# Patient Record
Sex: FEMALE | Race: WHITE | NOT HISPANIC OR LATINO | Employment: FULL TIME | URBAN - NONMETROPOLITAN AREA
[De-identification: names, ages, dates, MRNs, and addresses within clinical notes are randomized per-mention and may not be internally consistent; named-entity substitution may affect disease eponyms.]

---

## 2017-03-24 ENCOUNTER — HOSPITAL ENCOUNTER (INPATIENT)
Facility: HOSPITAL | Age: 42
LOS: 4 days | Discharge: HOME | DRG: 871 | End: 2017-03-28
Attending: FAMILY MEDICINE | Admitting: FAMILY MEDICINE
Payer: COMMERCIAL

## 2017-03-24 DIAGNOSIS — J18.9 PNEUMONIA OF LEFT LOWER LOBE DUE TO INFECTIOUS ORGANISM: Primary | ICD-10-CM

## 2017-03-24 PROBLEM — A41.89 SEPSIS DUE TO OTHER ETIOLOGY (H): Status: ACTIVE | Noted: 2017-03-24

## 2017-03-24 PROBLEM — R09.02 HYPOXIA: Status: ACTIVE | Noted: 2017-03-24

## 2017-03-24 PROBLEM — A41.9 SEPSIS (H): Status: ACTIVE | Noted: 2017-03-24

## 2017-03-24 LAB
CREAT SERPL-MCNC: 0.81 MG/DL (ref 0.52–1.04)
GFR SERPL CREATININE-BSD FRML MDRD: 78 ML/MIN/1.7M2
LACTATE SERPL-SCNC: 2.3 MMOL/L (ref 0.4–2)
LACTATE SERPL-SCNC: 4 MMOL/L (ref 0.4–2)

## 2017-03-24 PROCEDURE — S0077 INJECTION, CLINDAMYCIN PHOSP: HCPCS | Performed by: FAMILY MEDICINE

## 2017-03-24 PROCEDURE — 25800025 ZZH RX 258: Performed by: FAMILY MEDICINE

## 2017-03-24 PROCEDURE — 36415 COLL VENOUS BLD VENIPUNCTURE: CPT | Performed by: FAMILY MEDICINE

## 2017-03-24 PROCEDURE — 25000132 ZZH RX MED GY IP 250 OP 250 PS 637: Performed by: FAMILY MEDICINE

## 2017-03-24 PROCEDURE — 85025 COMPLETE CBC W/AUTO DIFF WBC: CPT | Performed by: FAMILY MEDICINE

## 2017-03-24 PROCEDURE — 80053 COMPREHEN METABOLIC PANEL: CPT | Performed by: FAMILY MEDICINE

## 2017-03-24 PROCEDURE — 82565 ASSAY OF CREATININE: CPT | Performed by: FAMILY MEDICINE

## 2017-03-24 PROCEDURE — 87040 BLOOD CULTURE FOR BACTERIA: CPT | Performed by: FAMILY MEDICINE

## 2017-03-24 PROCEDURE — 25000125 ZZHC RX 250: Performed by: FAMILY MEDICINE

## 2017-03-24 PROCEDURE — 25000128 H RX IP 250 OP 636: Performed by: FAMILY MEDICINE

## 2017-03-24 PROCEDURE — 20000003 ZZH R&B ICU

## 2017-03-24 PROCEDURE — 25000132 ZZH RX MED GY IP 250 OP 250 PS 637

## 2017-03-24 PROCEDURE — S0028 INJECTION, FAMOTIDINE, 20 MG: HCPCS | Performed by: FAMILY MEDICINE

## 2017-03-24 PROCEDURE — 83605 ASSAY OF LACTIC ACID: CPT | Performed by: FAMILY MEDICINE

## 2017-03-24 RX ORDER — TRAMADOL HYDROCHLORIDE 50 MG/1
50 TABLET ORAL EVERY 6 HOURS PRN
Status: DISCONTINUED | OUTPATIENT
Start: 2017-03-24 | End: 2017-03-28 | Stop reason: HOSPADM

## 2017-03-24 RX ORDER — DOPAMINE HYDROCHLORIDE 160 MG/100ML
2-20 INJECTION, SOLUTION INTRAVENOUS CONTINUOUS
Status: DISCONTINUED | OUTPATIENT
Start: 2017-03-25 | End: 2017-03-27

## 2017-03-24 RX ORDER — IBUPROFEN 600 MG/1
600 TABLET, FILM COATED ORAL EVERY 8 HOURS PRN
Status: DISCONTINUED | OUTPATIENT
Start: 2017-03-24 | End: 2017-03-28 | Stop reason: HOSPADM

## 2017-03-24 RX ORDER — DIPHENHYDRAMINE HYDROCHLORIDE 50 MG/ML
25 INJECTION INTRAMUSCULAR; INTRAVENOUS EVERY 6 HOURS PRN
Status: DISCONTINUED | OUTPATIENT
Start: 2017-03-24 | End: 2017-03-28 | Stop reason: HOSPADM

## 2017-03-24 RX ORDER — DEXTROSE MONOHYDRATE, SODIUM CHLORIDE, SODIUM LACTATE, POTASSIUM CHLORIDE, CALCIUM CHLORIDE 5; 600; 310; 179; 20 G/100ML; MG/100ML; MG/100ML; MG/100ML; MG/100ML
INJECTION, SOLUTION INTRAVENOUS CONTINUOUS
Status: DISCONTINUED | OUTPATIENT
Start: 2017-03-24 | End: 2017-03-25

## 2017-03-24 RX ORDER — CLINDAMYCIN PHOSPHATE 900 MG/50ML
900 INJECTION, SOLUTION INTRAVENOUS EVERY 8 HOURS
Status: DISCONTINUED | OUTPATIENT
Start: 2017-03-24 | End: 2017-03-28 | Stop reason: HOSPADM

## 2017-03-24 RX ORDER — ACETAMINOPHEN 325 MG/1
TABLET ORAL
Status: COMPLETED
Start: 2017-03-24 | End: 2017-03-24

## 2017-03-24 RX ORDER — ONDANSETRON 2 MG/ML
8 INJECTION INTRAMUSCULAR; INTRAVENOUS ONCE
Status: COMPLETED | OUTPATIENT
Start: 2017-03-24 | End: 2017-03-24

## 2017-03-24 RX ORDER — ACETAMINOPHEN 325 MG/1
650 TABLET ORAL EVERY 4 HOURS PRN
Status: DISCONTINUED | OUTPATIENT
Start: 2017-03-24 | End: 2017-03-28 | Stop reason: HOSPADM

## 2017-03-24 RX ORDER — OSELTAMIVIR PHOSPHATE 75 MG/1
75 CAPSULE ORAL 2 TIMES DAILY
Status: DISCONTINUED | OUTPATIENT
Start: 2017-03-24 | End: 2017-03-28 | Stop reason: HOSPADM

## 2017-03-24 RX ORDER — NALOXONE HYDROCHLORIDE 0.4 MG/ML
.1-.4 INJECTION, SOLUTION INTRAMUSCULAR; INTRAVENOUS; SUBCUTANEOUS
Status: DISCONTINUED | OUTPATIENT
Start: 2017-03-24 | End: 2017-03-28 | Stop reason: HOSPADM

## 2017-03-24 RX ORDER — LEVOFLOXACIN 5 MG/ML
750 INJECTION, SOLUTION INTRAVENOUS EVERY 24 HOURS
Status: DISCONTINUED | OUTPATIENT
Start: 2017-03-24 | End: 2017-03-28 | Stop reason: HOSPADM

## 2017-03-24 RX ORDER — DIPHENHYDRAMINE HCL 25 MG
25 CAPSULE ORAL
Status: DISCONTINUED | OUTPATIENT
Start: 2017-03-24 | End: 2017-03-28 | Stop reason: HOSPADM

## 2017-03-24 RX ORDER — ONDANSETRON 2 MG/ML
8 INJECTION INTRAMUSCULAR; INTRAVENOUS EVERY 8 HOURS PRN
Status: DISCONTINUED | OUTPATIENT
Start: 2017-03-24 | End: 2017-03-28 | Stop reason: HOSPADM

## 2017-03-24 RX ADMIN — ACETAMINOPHEN 650 MG: 325 TABLET, FILM COATED ORAL at 18:58

## 2017-03-24 RX ADMIN — ACETAMINOPHEN 650 MG: 325 TABLET, FILM COATED ORAL at 12:03

## 2017-03-24 RX ADMIN — CLINDAMYCIN IN 5 PERCENT DEXTROSE 900 MG: 18 INJECTION, SOLUTION INTRAVENOUS at 13:27

## 2017-03-24 RX ADMIN — VANCOMYCIN HYDROCHLORIDE 1250 MG: 1 INJECTION, POWDER, LYOPHILIZED, FOR SOLUTION INTRAVENOUS at 16:31

## 2017-03-24 RX ADMIN — SODIUM CHLORIDE 1000 ML: 9 INJECTION, SOLUTION INTRAVENOUS at 12:03

## 2017-03-24 RX ADMIN — SODIUM CHLORIDE, POTASSIUM CHLORIDE, SODIUM LACTATE AND CALCIUM CHLORIDE 1000 ML: 600; 310; 30; 20 INJECTION, SOLUTION INTRAVENOUS at 17:37

## 2017-03-24 RX ADMIN — ONDANSETRON 8 MG: 2 INJECTION INTRAMUSCULAR; INTRAVENOUS at 21:01

## 2017-03-24 RX ADMIN — LEVOFLOXACIN 750 MG: 5 INJECTION, SOLUTION INTRAVENOUS at 14:40

## 2017-03-24 RX ADMIN — ENOXAPARIN SODIUM 40 MG: 40 INJECTION SUBCUTANEOUS at 17:51

## 2017-03-24 RX ADMIN — FAMOTIDINE 20 MG: 10 INJECTION, SOLUTION INTRAVENOUS at 21:01

## 2017-03-24 RX ADMIN — TRAMADOL HYDROCHLORIDE 50 MG: 50 TABLET, COATED ORAL at 17:50

## 2017-03-24 RX ADMIN — SODIUM CHLORIDE, POTASSIUM CHLORIDE, SODIUM LACTATE AND CALCIUM CHLORIDE 1000 ML: 600; 310; 30; 20 INJECTION, SOLUTION INTRAVENOUS at 19:16

## 2017-03-24 RX ADMIN — CLINDAMYCIN IN 5 PERCENT DEXTROSE 900 MG: 18 INJECTION, SOLUTION INTRAVENOUS at 21:16

## 2017-03-24 RX ADMIN — IBUPROFEN 600 MG: 600 TABLET ORAL at 14:14

## 2017-03-24 ASSESSMENT — PAIN DESCRIPTION - DESCRIPTORS
DESCRIPTORS: ACHING

## 2017-03-24 NOTE — IP AVS SNAPSHOT
[facility]    [address] [place] [id number]    Phone:  [phone]    Fax:  [phone]                                       After Visit Summary   3/24/2017    Sofy Grossman    MRN: [id number]           After Visit Summary Signature Page     I have received my discharge instructions, and my questions have been answered. I have discussed any challenges I see with this plan with the nurse or doctor.    ..........................................................................................................................................  Patient/Patient Representative Signature      ..........................................................................................................................................  Patient Representative Print Name and Relationship to Patient    ..................................................               ................................................  Date                                            Time    ..........................................................................................................................................  Reviewed by Signature/Title    ...................................................              ..............................................  Date                                                            Time

## 2017-03-24 NOTE — PLAN OF CARE
Problem: Patient Goal: Rt Focus  Goal: 1. Patient Goal: RT Focus  [facility] - Respiratory Clinical Assessment     Current Patient History:    Respiratory History: tobacco abuse    Smoking History: 1/2 ppd    Oxygen dependency: No,    Oxygen prescribed: none     3/24/2017 2:09 PM Patient Initial Assessment:     Level of Consciousness: alert , cooperative    Skin color: pink    Lung sounds:coarse    Respirations:  Normal with easy respirations and no use of accessory muscles to breathe    Respiratory symptoms: productive cough    Cough/Sputum:  productive of yellow sputum    Current oxygenation status: 91% on 2lpm

## 2017-03-24 NOTE — PROGRESS NOTES
ARMAND MAKI - Patient requested visit by . Patient looks and noted she feels very sick but requested prayer with .

## 2017-03-24 NOTE — IP AVS SNAPSHOT
MRN:[id number]                      After Visit Summary   3/24/2017    Sofy Grossman    MRN: [id number]           Thank you!     Thank you for choosing [facility] for your care. Our goal is always to provide you with excellent care. Hearing back from our patients is one way we can continue to improve our services. Please take a few minutes to complete the written survey that you may receive in the mail after you visit with us. Thank you!        Patient Information     Date Of Birth          1975        About your hospital stay     You were admitted on:  March 24, 2017 You last received care in the:  [facility]    You were discharged on:  March 28, 2017       Who to Call     For medical emergencies, please call 911.  For non-urgent questions about your medical care, please call your primary care provider or clinic, [phone]          Attending Provider     Provider Specialty    Kip Sutherland DO [facility]       Primary Care Provider Office Phone # Fax #    Kip Sutherland [phone]       [facility] [address] [facility] [address]        After Care Instructions     Activity       Your activity upon discharge: activity as tolerated            Diet       Follow this diet upon discharge: Regular                  Follow-up Appointments     Follow-up and recommended labs and tests        Follow up with primary care provider, Kip Sutherland, within 7 days for hospital follow- up.  No follow up labs or test are needed.                  Pending Results     Date and Time Order Name Status Description    3/24/2017 1159 Blood culture Preliminary     3/24/2017 1159 Blood culture Preliminary             Statement of Approval     Ordered          03/28/17 0814  I have reviewed and agree with all the recommendations and orders detailed in this document.  EFFECTIVE NOW     Approved and electronically signed by:  Kip Sutherland DO   "           Admission Information     Date & Time Provider Department Dept. Phone    3/24/2017 Kip Sutherland, DO [facility] [phone]      Your Vitals Were     Blood Pressure Pulse Temperature Respirations Height Weight    131/85 (BP Location: Right arm) 83 100.3  F (37.9  C) (Tympanic) 20 1.702 m (5' 7\") 71 kg (156 lb 8.4 oz)    Pulse Oximetry BMI (Body Mass Index)                94% 24.52 kg/m2          Soonr Information     Soonr lets you send messages to your doctor, view your test results, renew your prescriptions, schedule appointments and more. To sign up, go to [email or web address] . Click on \"Log in\" on the left side of the screen, which will take you to the Welcome page. Then click on \"Sign up Now\" on the right side of the page.     You will be asked to enter the access code listed below, as well as some personal information. Please follow the directions to create your username and password.     Your access code is: [email or web address]  Expires: 2017  8:47 AM     Your access code will  in 90 days. If you need help or a new code, please call your [place] clinic or [phone].        Care EveryWhere ID     This is your Care EveryWhere ID. This could be used by other organizations to access your [place] medical records  [id number]           Review of your medicines      START taking        Dose / Directions    levofloxacin 500 MG tablet   Commonly known as:  LEVAQUIN        Dose:  500 mg   Take 1 tablet (500 mg) by mouth daily for 5 days   Quantity:  5 tablet   Refills:  0         CONTINUE these medicines which have NOT CHANGED        Dose / Directions    ACETAMINOPHEN PO        Dose:  500 mg   Take 500 mg by mouth every 4 hours as needed for pain   Refills:  0       IBUPROFEN PO        Dose:  600 mg   Take 600 mg by mouth every 8 hours as needed for moderate pain   Refills:  0       NONFORMULARY        as needed \"Eladil cream\" for exema   Refills:  0         STOP taking     " "TAMIFLU PO                Where to get your medicines      Some of these will need a paper prescription and others can be bought over the counter. Ask your nurse if you have questions.     Bring a paper prescription for each of these medications     levofloxacin 500 MG tablet                Protect others around you: Learn how to safely use, store and throw away your medicines at www.disposemymeds.org.             Medication List: This is a list of all your medications and when to take them. Check marks below indicate your daily home schedule. Keep this list as a reference.      Medications           Morning Afternoon Evening Bedtime As Needed    ACETAMINOPHEN PO   Take 500 mg by mouth every 4 hours as needed for pain   Last time this was given:  650 mg on 3/27/2017 11:35 PM                                IBUPROFEN PO   Take 600 mg by mouth every 8 hours as needed for moderate pain   Last time this was given:  600 mg on 3/27/2017 11:51 AM                                levofloxacin 500 MG tablet   Commonly known as:  LEVAQUIN   Take 1 tablet (500 mg) by mouth daily for 5 days                                NONFORMULARY   as needed \"Eladil cream\" for exema                                  "

## 2017-03-24 NOTE — PHARMACY-VANCOMYCIN DOSING SERVICE
Pharmacy Vancomycin Initial Note  Date of Service 2017  Patient's  1975  41 year old, female    Indication: Sepsis,pneumonia    Current estimated CrCl = Estimated Creatinine Clearance: 92.3 mL/min (based on Cr of 0.81).    Creatinine for last 3 days  3/24/2017: 12:04 PM Creatinine 0.81 mg/dL    Recent Vancomycin Level(s) for last 3 days  No results found for requested labs within last 72 hours.      Vancomycin IV Administrations (past 72 hours)      No vancomycin orders with administrations in past 72 hours.                Nephrotoxins and other renal medications (Future)    Start     Dose/Rate Route Frequency Ordered Stop    17 1415  vancomycin (VANCOCIN) 1,250 mg in NaCl 0.9 % 250 mL intermittent infusion      1,250 mg Intravenous EVERY 12 HOURS 17 1405      17 1401  ibuprofen (ADVIL/MOTRIN) tablet 600 mg      600 mg Oral EVERY 8 HOURS PRN 17 1402            Contrast Orders - past 72 hours     None                Plan:  1.  Start vancomycin  1250 mg IV q12h.   2.  Goal Trough Level: 15-20 mg/L   3.  Pharmacy will check trough levels as appropriate in 1-3 Days.    4. Serum creatinine levels will be ordered daily for the first week of therapy and at least twice weekly for subsequent weeks.    5. Selden method utilized to dose vancomycin therapy: Method 2    Erica Alcantara

## 2017-03-24 NOTE — PROGRESS NOTES
Addendum: Pt still has low BP and complains of body aches.  Fever down but still elevated.  Will give additional liter bolus of fluid if no improvement will consider transfer to ICU for pressers.  Cultures and labs are still pending .

## 2017-03-24 NOTE — PLAN OF CARE
Problem: Goal Outcome Summary  Goal: Goal Outcome Summary  Outcome: No Change  A&O. VS, HR tachy 125-110, BP slightly hypotensive 96/50's, RR 28. Temp 104.8 cool cloth to skin, fan to room, tylenol administered with decrease to 99.8. IVF running of NS. IV cleocin and Levaquin administered this shift with Vancomycin to be given. LS diminished in bases. Infrequent nonproductive cough noted. Tested positive for influenza B, not able to obtain second flu swab this shift. Lactic acid 4.0, ordered redraw in 6hrs. Pt reports dizziness on moving, educated to use call light for any transfers. Reg diet with decreased appetite.      Face to face report given with opportunity to observe patient.    Report given to CHERYL Phan   3/24/2017  3:40 PM

## 2017-03-24 NOTE — PLAN OF CARE
Problem: Goal Outcome Summary  Goal: Goal Outcome Summary  Outcome: No Change  [facility] Inpatient Admission Note:     Patient admitted to 3232/3232-1 at approximately 1200 via wheel chair accompanied by mother from clinic . Report received from Radha in SBAR format at 1200 via face to face in room. Patient ambulated to bed via self.. Patient is alert and oriented X 3, reports pain; rates at 8 on 0-10 scale.  Patient oriented to room, unit, hourly rounding, and plan of care. Explained admission packet and patient handbook with patient bill of rights brochure. Will continue to monitor and document as needed.      Inpatient Nursing criteria listed below was met:        Health care directives status obtained and documented: Yes        Care Everywhere authorization obtained No        MRSA swab completed for patient 65 years and older: N/A        Patient identifies a surrogate decision maker: Yes If yes, who:Spouse Herb Contact Information:[phone]        Core Measure diagnosis present:Yes. If yes, state diagnosis: Pneumonia         If initial lactic acid >2.0, repeat lactic acid drawn within one hour of arrival to unit: NA. If no, state reason: Lactic drawn on arrival        Vaccination assessment and education completed: Yes              Vaccinations received prior to admission: Pneumovax no    Influenza(seasonal)  NO              Vaccination(s) ordered: patient declines        Clergy visit ordered if patient requests: N/A        Skin issues/needs documented: N/A        Isolation Patient: yes Education given, correct sign in place and documentation row added to PCS:  Yes        Fall Prevention No: Care plan updated, education given and documented, sticker and magnet in place: No        Care Plan initiated: Yes        Education Documented (including assessment): Yes        Patient has discharge needs : No If yes, please explain:N/A

## 2017-03-24 NOTE — H&P
[facility]    History and Physical  Hospitalist       Date of Admission:  3/24/2017    Assessment & Plan   Sofy Grossman is a 41 year old female who presents with     Principal Problem:    Pneumonia of left lower lobe due to infectious organism    Assessment: PT diagnosed yesterday with influenza B today noticed difficulty breathing and presented to clinic with hypoxia and infiltrate on lung.    Plan: admit to hospital pt shows signs of sepsis as well as influenza.  Will culture and provide fluids and triple antibiotic coverage until improvement of source identified.  Active Problems:    Sepsis due to other etiology (H)    Assessment: PT has low BP and tachycardia and fever with hypoxia.    Plan: will admit and treat empirically for sepsis and follow fluid challenge and triple antibiotic    Hypoxia    Assessment: stable    Plan: will provide oxygen and pulmonary toilet    DVT Prophylaxis: Enoxaprain (Lovenox) SQ  Code Status: Full Code    Disposition: Expected discharge in 3  days once sepsis if resolved.    Kip Sutherland    Primary Care Physician   Kip Sutherland    Chief Complaint   Fever and dyspnea    History is obtained from the patient    History of Present Illness   Sofy Grossman is a 41 year old female who presents with acute onset of dyspnea.  PT was treated for influenza B and now has infiltrate of LLL. Pt complained of pain with inspiration radiating to the left lower back area.    Past Medical History    I have reviewed this patient's medical history and updated it with pertinent information if needed.   Past Medical History:   Diagnosis Date     Cervical spine fracture (H)      Depressive disorder        Past Surgical History   I have reviewed this patient's surgical history and updated it with pertinent information if needed.  Past Surgical History:   Procedure Laterality Date     GYN SURGERY      LEEP 2008       Prior to Admission Medications   Prior to Admission  "Medications   Prescriptions Last Dose Informant Patient Reported? Taking?   ACETAMINOPHEN PO   Yes Yes   Sig: Take 500 mg by mouth every 4 hours as needed for pain   IBUPROFEN PO   Yes Yes   Sig: Take 600 mg by mouth every 8 hours as needed for moderate pain   NONFORMULARY Past Week at Unknown time  Yes Yes   Sig: as needed \"Eladil cream\" for exema   Oseltamivir Phosphate (TAMIFLU PO)   Yes Yes   Sig: Take 75 mg by mouth 2 times daily      Facility-Administered Medications: None     Allergies   Allergies   Allergen Reactions     Doxycycline Nausea and Vomiting     Penicillin G      Penicillins        Social History   I have reviewed this patient's social history and updated it with pertinent information if needed. Sofy Grossman  reports that she has been smoking Cigarettes.  She has a 5.00 pack-year smoking history. She does not have any smokeless tobacco history on file. She reports that she drinks alcohol. She reports that she does not use illicit drugs.    Family History   I have reviewed this patient's family history and updated it with pertinent information if needed.   Family History   Problem Relation Age of Onset     Substance Abuse Mother      Coronary Artery Disease Father      Substance Abuse Father        Review of Systems   C: POSITIVE for fever, chills,   E/M: NEGATIVE for ear, mouth and throat problems  R: POSITIVE for significant cough or SOB  CV: POSITIVE for chest pain,     Physical Exam   Temp: (!) 103.2  F (39.6  C) Temp src: Tympanic BP: 96/55   Heart Rate: 119 Resp: (!) 28 SpO2: 94 % O2 Device: Nasal cannula Oxygen Delivery: 2 LPM  Vital Signs with Ranges  Temp:  [103.2  F (39.6  C)-104.8  F (40.4  C)] 103.2  F (39.6  C)  Heart Rate:  [119-125] 119  Resp:  [28] 28  BP: ()/(55-64) 96/55  SpO2:  [93 %-96 %] 94 %  138 lbs 3.65 oz    Constitutional: ill appearing  Eyes: moises icteric  HEENT: mild inflamation of pharynx and congested sinuses  Respiratory: diminished sounds in LEft no " wheezing  Cardiovascular: RRR without M  GI: soft and non tender  Lymph/Hematologic: normal  Genitourinary: normal  Skin: no rashes  Musculoskeletal: normal no weakness  Neurologic: no focal defecits  Psychiatric: normal    Data   Data reviewed today:  I personally reviewed the chest x-ray image(s) showing Left lower infiltrate.  No lab results found in last 7 days.    No results found for this or any previous visit (from the past 24 hour(s)).

## 2017-03-24 NOTE — PROVIDER NOTIFICATION
DATE:  3/24/2017   TIME OF RECEIPT FROM LAB:  4233  LAB TEST:  Lactic acid  LAB VALUE:  4.0  RESULTS GIVEN WITH READ-BACK TO Dr. Sutherland  TIME LAB VALUE REPORTED TO PROVIDER:   Paged at 2289 -

## 2017-03-24 NOTE — PROVIDER NOTIFICATION
ronna returned page at 0428-   DATE:  3/24/2017   TIME OF RECEIPT FROM LAB:  1231  LAB TEST:lactate  LAB VALUE:4.0  RESULTS GIVEN WITH READ-BACK TO (PROVIDER):  ronna  TIME LAB VALUE REPORTED TO PROVIDER:  6059

## 2017-03-25 LAB
ALBUMIN SERPL-MCNC: 2.2 G/DL (ref 3.4–5)
ALBUMIN SERPL-MCNC: 2.2 G/DL (ref 3.4–5)
ALP SERPL-CCNC: 21 U/L (ref 40–150)
ALP SERPL-CCNC: 22 U/L (ref 40–150)
ALT SERPL W P-5'-P-CCNC: 15 U/L (ref 0–50)
ALT SERPL W P-5'-P-CCNC: 16 U/L (ref 0–50)
ANION GAP SERPL CALCULATED.3IONS-SCNC: 10 MMOL/L (ref 3–14)
ANION GAP SERPL CALCULATED.3IONS-SCNC: 8 MMOL/L (ref 3–14)
AST SERPL W P-5'-P-CCNC: 19 U/L (ref 0–45)
AST SERPL W P-5'-P-CCNC: 19 U/L (ref 0–45)
BASOPHILS # BLD AUTO: 0 10E9/L (ref 0–0.2)
BASOPHILS # BLD AUTO: 0 10E9/L (ref 0–0.2)
BASOPHILS NFR BLD AUTO: 0 %
BASOPHILS NFR BLD AUTO: 0 %
BILIRUB SERPL-MCNC: 0.5 MG/DL (ref 0.2–1.3)
BILIRUB SERPL-MCNC: 0.6 MG/DL (ref 0.2–1.3)
BUN SERPL-MCNC: 8 MG/DL (ref 7–30)
BUN SERPL-MCNC: 9 MG/DL (ref 7–30)
CALCIUM SERPL-MCNC: 6.9 MG/DL (ref 8.5–10.1)
CALCIUM SERPL-MCNC: 7.2 MG/DL (ref 8.5–10.1)
CHLORIDE SERPL-SCNC: 102 MMOL/L (ref 94–109)
CHLORIDE SERPL-SCNC: 109 MMOL/L (ref 94–109)
CO2 SERPL-SCNC: 20 MMOL/L (ref 20–32)
CO2 SERPL-SCNC: 24 MMOL/L (ref 20–32)
CREAT SERPL-MCNC: 0.64 MG/DL (ref 0.52–1.04)
CREAT SERPL-MCNC: 0.66 MG/DL (ref 0.52–1.04)
DIFFERENTIAL METHOD BLD: ABNORMAL
DIFFERENTIAL METHOD BLD: ABNORMAL
EOSINOPHIL # BLD AUTO: 0 10E9/L (ref 0–0.7)
EOSINOPHIL # BLD AUTO: 0 10E9/L (ref 0–0.7)
EOSINOPHIL NFR BLD AUTO: 0 %
EOSINOPHIL NFR BLD AUTO: 0 %
ERYTHROCYTE [DISTWIDTH] IN BLOOD BY AUTOMATED COUNT: 12.7 % (ref 10–15)
ERYTHROCYTE [DISTWIDTH] IN BLOOD BY AUTOMATED COUNT: 12.8 % (ref 10–15)
GFR SERPL CREATININE-BSD FRML MDRD: ABNORMAL ML/MIN/1.7M2
GFR SERPL CREATININE-BSD FRML MDRD: ABNORMAL ML/MIN/1.7M2
GLUCOSE SERPL-MCNC: 86 MG/DL (ref 70–99)
GLUCOSE SERPL-MCNC: 96 MG/DL (ref 70–99)
HCT VFR BLD AUTO: 29.6 % (ref 35–47)
HCT VFR BLD AUTO: 32 % (ref 35–47)
HGB BLD-MCNC: 10.6 G/DL (ref 11.7–15.7)
HGB BLD-MCNC: 11.5 G/DL (ref 11.7–15.7)
LACTATE SERPL-SCNC: 1.8 MMOL/L (ref 0.4–2)
LYMPHOCYTES # BLD AUTO: 0.6 10E9/L (ref 0.8–5.3)
LYMPHOCYTES # BLD AUTO: 0.9 10E9/L (ref 0.8–5.3)
LYMPHOCYTES NFR BLD AUTO: 27 %
LYMPHOCYTES NFR BLD AUTO: 28 %
MCH RBC QN AUTO: 31.8 PG (ref 26.5–33)
MCH RBC QN AUTO: 31.9 PG (ref 26.5–33)
MCHC RBC AUTO-ENTMCNC: 35.8 G/DL (ref 31.5–36.5)
MCHC RBC AUTO-ENTMCNC: 35.9 G/DL (ref 31.5–36.5)
MCV RBC AUTO: 89 FL (ref 78–100)
MCV RBC AUTO: 89 FL (ref 78–100)
METAMYELOCYTES # BLD: 0.1 10E9/L
METAMYELOCYTES # BLD: 0.2 10E9/L
METAMYELOCYTES NFR BLD MANUAL: 6 %
METAMYELOCYTES NFR BLD MANUAL: 6 %
MONOCYTES # BLD AUTO: 0 10E9/L (ref 0–1.3)
MONOCYTES # BLD AUTO: 0 10E9/L (ref 0–1.3)
MONOCYTES NFR BLD AUTO: 1 %
MONOCYTES NFR BLD AUTO: 2 %
MYELOCYTES # BLD: 0 10E9/L
MYELOCYTES # BLD: 0.1 10E9/L
MYELOCYTES NFR BLD MANUAL: 2 %
MYELOCYTES NFR BLD MANUAL: 2 %
NEUTROPHILS # BLD AUTO: 1.1 10E9/L (ref 1.6–8.3)
NEUTROPHILS # BLD AUTO: 1.9 10E9/L (ref 1.6–8.3)
NEUTROPHILS NFR BLD AUTO: 49 %
NEUTROPHILS NFR BLD AUTO: 56 %
NEUTS BAND # BLD AUTO: 0.3 10E9/L (ref 0–0.6)
NEUTS BAND # BLD AUTO: 0.3 10E9/L (ref 0–0.6)
NEUTS BAND NFR BLD MANUAL: 13 %
NEUTS BAND NFR BLD MANUAL: 8 %
PLATELET # BLD AUTO: 63 10E9/L (ref 150–450)
PLATELET # BLD AUTO: 76 10E9/L (ref 150–450)
POTASSIUM SERPL-SCNC: 3.1 MMOL/L (ref 3.4–5.3)
POTASSIUM SERPL-SCNC: 3.4 MMOL/L (ref 3.4–5.3)
POTASSIUM SERPL-SCNC: 3.4 MMOL/L (ref 3.4–5.3)
PROT SERPL-MCNC: 4.8 G/DL (ref 6.8–8.8)
PROT SERPL-MCNC: 4.8 G/DL (ref 6.8–8.8)
RBC # BLD AUTO: 3.33 10E12/L (ref 3.8–5.2)
RBC # BLD AUTO: 3.61 10E12/L (ref 3.8–5.2)
SODIUM SERPL-SCNC: 132 MMOL/L (ref 133–144)
SODIUM SERPL-SCNC: 141 MMOL/L (ref 133–144)
WBC # BLD AUTO: 2.2 10E9/L (ref 4–11)
WBC # BLD AUTO: 3.4 10E9/L (ref 4–11)

## 2017-03-25 PROCEDURE — 40000275 ZZH STATISTIC RCP TIME EA 10 MIN

## 2017-03-25 PROCEDURE — 85025 COMPLETE CBC W/AUTO DIFF WBC: CPT | Performed by: FAMILY MEDICINE

## 2017-03-25 PROCEDURE — 25000132 ZZH RX MED GY IP 250 OP 250 PS 637: Performed by: FAMILY MEDICINE

## 2017-03-25 PROCEDURE — 25000125 ZZHC RX 250: Performed by: FAMILY MEDICINE

## 2017-03-25 PROCEDURE — 25000128 H RX IP 250 OP 636: Performed by: FAMILY MEDICINE

## 2017-03-25 PROCEDURE — 25800025 ZZH RX 258: Performed by: FAMILY MEDICINE

## 2017-03-25 PROCEDURE — S0077 INJECTION, CLINDAMYCIN PHOSP: HCPCS | Performed by: FAMILY MEDICINE

## 2017-03-25 PROCEDURE — 20000003 ZZH R&B ICU

## 2017-03-25 PROCEDURE — 36415 COLL VENOUS BLD VENIPUNCTURE: CPT | Performed by: FAMILY MEDICINE

## 2017-03-25 PROCEDURE — 84132 ASSAY OF SERUM POTASSIUM: CPT | Performed by: FAMILY MEDICINE

## 2017-03-25 PROCEDURE — 80053 COMPREHEN METABOLIC PANEL: CPT | Performed by: FAMILY MEDICINE

## 2017-03-25 PROCEDURE — S0028 INJECTION, FAMOTIDINE, 20 MG: HCPCS | Performed by: FAMILY MEDICINE

## 2017-03-25 RX ORDER — DOPAMINE HYDROCHLORIDE 160 MG/100ML
INJECTION, SOLUTION INTRAVENOUS
Status: DISCONTINUED
Start: 2017-03-25 | End: 2017-03-26 | Stop reason: HOSPADM

## 2017-03-25 RX ORDER — POTASSIUM CHLORIDE 1500 MG/1
20-40 TABLET, EXTENDED RELEASE ORAL
Status: DISCONTINUED | OUTPATIENT
Start: 2017-03-25 | End: 2017-03-28 | Stop reason: HOSPADM

## 2017-03-25 RX ORDER — POTASSIUM CHLORIDE 1.5 G/1.58G
20-40 POWDER, FOR SOLUTION ORAL
Status: DISCONTINUED | OUTPATIENT
Start: 2017-03-25 | End: 2017-03-28 | Stop reason: HOSPADM

## 2017-03-25 RX ORDER — ALUMINA, MAGNESIA, AND SIMETHICONE 2400; 2400; 240 MG/30ML; MG/30ML; MG/30ML
15 SUSPENSION ORAL EVERY 4 HOURS PRN
Status: DISCONTINUED | OUTPATIENT
Start: 2017-03-25 | End: 2017-03-28 | Stop reason: HOSPADM

## 2017-03-25 RX ORDER — LOPERAMIDE HCL 2 MG
2 CAPSULE ORAL 4 TIMES DAILY PRN
Status: DISCONTINUED | OUTPATIENT
Start: 2017-03-25 | End: 2017-03-28 | Stop reason: HOSPADM

## 2017-03-25 RX ORDER — SODIUM CHLORIDE, SODIUM LACTATE, POTASSIUM CHLORIDE, CALCIUM CHLORIDE 600; 310; 30; 20 MG/100ML; MG/100ML; MG/100ML; MG/100ML
INJECTION, SOLUTION INTRAVENOUS CONTINUOUS
Status: DISCONTINUED | OUTPATIENT
Start: 2017-03-25 | End: 2017-03-28 | Stop reason: HOSPADM

## 2017-03-25 RX ORDER — POTASSIUM CHLORIDE 1.5 G/1.58G
POWDER, FOR SOLUTION ORAL
Status: DISCONTINUED
Start: 2017-03-25 | End: 2017-03-25 | Stop reason: WASHOUT

## 2017-03-25 RX ORDER — MAGNESIUM HYDROXIDE/ALUMINUM HYDROXICE/SIMETHICONE 120; 1200; 1200 MG/30ML; MG/30ML; MG/30ML
30 SUSPENSION ORAL EVERY 4 HOURS PRN
Status: DISCONTINUED | OUTPATIENT
Start: 2017-03-25 | End: 2017-03-25

## 2017-03-25 RX ORDER — POTASSIUM CHLORIDE 7.45 MG/ML
10 INJECTION INTRAVENOUS
Status: DISCONTINUED | OUTPATIENT
Start: 2017-03-25 | End: 2017-03-28 | Stop reason: HOSPADM

## 2017-03-25 RX ADMIN — SODIUM CHLORIDE, POTASSIUM CHLORIDE, SODIUM LACTATE AND CALCIUM CHLORIDE: 600; 310; 30; 20 INJECTION, SOLUTION INTRAVENOUS at 15:54

## 2017-03-25 RX ADMIN — ONDANSETRON 8 MG: 2 INJECTION INTRAMUSCULAR; INTRAVENOUS at 14:27

## 2017-03-25 RX ADMIN — LOPERAMIDE HYDROCHLORIDE 2 MG: 2 CAPSULE ORAL at 10:36

## 2017-03-25 RX ADMIN — LEVOFLOXACIN 750 MG: 5 INJECTION, SOLUTION INTRAVENOUS at 13:05

## 2017-03-25 RX ADMIN — POTASSIUM CHLORIDE 10 MEQ: 14.9 INJECTION, SOLUTION, CONCENTRATE PARENTERAL at 18:04

## 2017-03-25 RX ADMIN — ENOXAPARIN SODIUM 40 MG: 40 INJECTION SUBCUTANEOUS at 17:12

## 2017-03-25 RX ADMIN — CLINDAMYCIN IN 5 PERCENT DEXTROSE 900 MG: 18 INJECTION, SOLUTION INTRAVENOUS at 05:06

## 2017-03-25 RX ADMIN — IBUPROFEN 600 MG: 600 TABLET ORAL at 15:35

## 2017-03-25 RX ADMIN — IBUPROFEN 600 MG: 600 TABLET ORAL at 02:32

## 2017-03-25 RX ADMIN — POTASSIUM CHLORIDE 10 MEQ: 14.9 INJECTION, SOLUTION, CONCENTRATE PARENTERAL at 09:22

## 2017-03-25 RX ADMIN — TRAMADOL HYDROCHLORIDE 50 MG: 50 TABLET, COATED ORAL at 08:59

## 2017-03-25 RX ADMIN — ALUMINUM HYDROXIDE, MAGNESIUM HYDROXIDE, AND DIMETHICONE 15 ML: 400; 400; 40 SUSPENSION ORAL at 09:16

## 2017-03-25 RX ADMIN — VANCOMYCIN HYDROCHLORIDE 1250 MG: 1 INJECTION, POWDER, LYOPHILIZED, FOR SOLUTION INTRAVENOUS at 02:36

## 2017-03-25 RX ADMIN — CLINDAMYCIN IN 5 PERCENT DEXTROSE 900 MG: 18 INJECTION, SOLUTION INTRAVENOUS at 13:05

## 2017-03-25 RX ADMIN — FAMOTIDINE 20 MG: 10 INJECTION, SOLUTION INTRAVENOUS at 21:11

## 2017-03-25 RX ADMIN — POTASSIUM CHLORIDE 10 MEQ: 14.9 INJECTION, SOLUTION, CONCENTRATE PARENTERAL at 10:40

## 2017-03-25 RX ADMIN — DOPAMINE HYDROCHLORIDE 2 MCG/KG/MIN: 160 INJECTION, SOLUTION INTRAVENOUS at 00:03

## 2017-03-25 RX ADMIN — OSELTAMIVIR PHOSPHATE 75 MG: 75 CAPSULE ORAL at 00:19

## 2017-03-25 RX ADMIN — ONDANSETRON 8 MG: 2 INJECTION INTRAMUSCULAR; INTRAVENOUS at 06:06

## 2017-03-25 RX ADMIN — LOPERAMIDE HYDROCHLORIDE 2 MG: 2 CAPSULE ORAL at 11:18

## 2017-03-25 RX ADMIN — CLINDAMYCIN IN 5 PERCENT DEXTROSE 900 MG: 18 INJECTION, SOLUTION INTRAVENOUS at 21:12

## 2017-03-25 RX ADMIN — LOPERAMIDE HYDROCHLORIDE 2 MG: 2 CAPSULE ORAL at 18:16

## 2017-03-25 RX ADMIN — FAMOTIDINE 20 MG: 10 INJECTION, SOLUTION INTRAVENOUS at 11:09

## 2017-03-25 RX ADMIN — OSELTAMIVIR PHOSPHATE 75 MG: 75 CAPSULE ORAL at 08:59

## 2017-03-25 RX ADMIN — TRAMADOL HYDROCHLORIDE 50 MG: 50 TABLET, COATED ORAL at 21:42

## 2017-03-25 RX ADMIN — SODIUM CHLORIDE, POTASSIUM CHLORIDE, SODIUM LACTATE AND CALCIUM CHLORIDE: 600; 310; 30; 20 INJECTION, SOLUTION INTRAVENOUS at 22:10

## 2017-03-25 RX ADMIN — POTASSIUM CHLORIDE 10 MEQ: 14.9 INJECTION, SOLUTION, CONCENTRATE PARENTERAL at 11:33

## 2017-03-25 RX ADMIN — VANCOMYCIN HYDROCHLORIDE 1250 MG: 1 INJECTION, POWDER, LYOPHILIZED, FOR SOLUTION INTRAVENOUS at 15:42

## 2017-03-25 ASSESSMENT — PAIN DESCRIPTION - DESCRIPTORS
DESCRIPTORS: SORE
DESCRIPTORS: ACHING

## 2017-03-25 NOTE — PLAN OF CARE
Problem: Patient Goal: Social Work Focus  Goal: 1. Patient Goal: Social Work Focus  Assessment completed via flow sheet.     Sofy is sitting in bed with the TV on. Her PCP is Dr Sutherland, her dentist is Dr Castro and her eye care professional is Dr Hung. She does not have a POA or a healthcare directive and does not want any information. She uses [facility]. She is not a .      She lives at home with her , a son and 2 stepchildren and a dog. She also has another stepchild in [place]. She states she feels safe at home and that her home is well maintained. They both shop and Herb does the food prep. She drives and has reliable information. 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    She does not have any sleep issues or mood concerns. She stopped smoking last Saturday 03/18/2017. she consumes alcohol occasionally. Her steph is important to her and that  has already been to see her. She enjoys spending time with her family.     Her plan is to discharge to home. No needs identified. Will remain available for discharge planning.

## 2017-03-25 NOTE — PLAN OF CARE
"Patient was transferred to ICU  at approximately 2250 PM via Wheelchair.   Patient status stable. Patient's most recent vitals: Blood pressure (!) 83/57, pulse 91, temperature 98.4  F (36.9  C), temperature source Tympanic, resp. rate 20, height 1.702 m (5' 7\"), weight 62.7 kg (138 lb 3.7 oz), SpO2 92 %, not currently breastfeeding.    Pt transferred with intact IV.Yes  Intact IV site at Left and rt arm    Pt transferred with oxygen. .No   O@2via na at na    Other LDA\"s: na    Belongings were sent with Patient to room 3126    AVS and pertinent records sent with patient. .N/A     Report given to Dedra LUNA in SBAR format.     "

## 2017-03-25 NOTE — PLAN OF CARE
Problem: Patient Goal: Rt Focus  Goal: 1. Patient Goal: RT Focus  Outcome: Improving  Weaned O2 to 1lpm-94%- pt.was able to achieve 1500mls with IS

## 2017-03-25 NOTE — PLAN OF CARE
Problem: Goal Outcome Summary  Goal: Goal Outcome Summary  Outcome: No Change  Pt A&O, BP 80's/50's and 70's/ 50.  Bolus' infused t/o shift with no change in BP, max , max temp 100.4.  Gave Tylenol and Ultram for body aches and paines.  Lungs dim and she was on 2 LPM O2 with SATS 93-95%, pt has some SOB.  On IV Vanco, Levaquin and Cleocin.  NS infusing at 150 mls/hr.  Second IV placed this shift, ambulates to  with standby asst with lightheadedness when up.  Pt to start on Tamiflu tonite, brought to pt but she was nauseated and c/o heartburn so didn't give, reported to ICU nurse to give dose.  Pt had 2 bouts of loose stools this shift.  8 mg IV Zofran and 2 mg IV pepcid given with decrease in nausea and heartburn.  Pt transferred to ICU to be started on Dopamine drip. Repeat lactic was 2.3 with redraw scheduled for 2230 tonite.  No alarms on and call light in reach,  present in room and attentive to pt.

## 2017-03-25 NOTE — PLAN OF CARE
Face to face report given with opportunity to observe patient.    Report given to CHERYL Jackson   3/25/2017  7:13 AM

## 2017-03-25 NOTE — PROVIDER NOTIFICATION
Notified MD pts BP 81/46 with HR 92 after 500 ml bolus. Pt c/o heartburn with nausea.  MD ordered 8 mg IV Zofran and 20 ml IV Pepcid once.  Will give and continue to monitor.

## 2017-03-25 NOTE — PROVIDER NOTIFICATION
Notified MD when bolus finished that BP was still 79/47.  MD wants BP check in 30 mins after Zofran and pepcid have a chance to work.  Will call MD back with results

## 2017-03-25 NOTE — PLAN OF CARE
Pt transferred to ICU at 2225.  Face to face report given with opportunity to observe patient.    Report given to Dedra Martin   3/24/2017  10:41 PM

## 2017-03-25 NOTE — PROVIDER NOTIFICATION
Notified MD that pts BP after 1000 ml Bolus was 88/60 with .  Another 1000 ml Bolus ordered with BP recheck to follow.  Also notified MD that Lactic went from 4.o to 2.3

## 2017-03-25 NOTE — PROGRESS NOTES
Addendum: Pt still having low BP in spite of fluid challenge.  Will transfer to ICU for dopamine and follow.  Pt complains of body aches and pain with inspiration and cough.  This still could be viral but must cover for bacterial infection with infiltrate in left lung and increased lactic acid which is improved since admission.

## 2017-03-25 NOTE — PROGRESS NOTES
[facility]  Progress Note            Subjective:   Feels minimally better. Still achy. Didn't sleep last night. Having heartburn this am                 Medications:     Current Facility-Administered Medications Ordered in Epic   Medication Dose Route Frequency Last Rate Last Dose     lactated ringers infusion   Intravenous Continuous 150 mL/hr at 03/25/17 0050       potassium chloride SA (K-DUR/KLOR-CON M) CR tablet 20-40 mEq  20-40 mEq Oral Q2H PRN         potassium chloride (KLOR-CON) Packet 20-40 mEq  20-40 mEq Oral or Feeding Tube Q2H PRN         potassium chloride 10 mEq in 100 mL intermittent infusion  10 mEq Intravenous Q1H PRN         potassium chloride 10 mEq in 100 mL intermittent infusion with 10 mg lidocaine  10 mEq Intravenous Q1H PRN         famotidine (PEPCID) 20 mg in NaCl 0.9 % intermittent infusion  20 mg Intravenous BID         alum & mag hydroxide-simethicone (MYLANTA/MAALOX) suspension 30 mL  30 mL Oral Q4H PRN         loperamide (IMODIUM) capsule 2 mg  2 mg Oral 4x Daily PRN         acetaminophen (TYLENOL) tablet 650 mg  650 mg Oral Q4H PRN   650 mg at 03/24/17 1858     oseltamivir (TAMIFLU) capsule 75 mg  75 mg Oral BID   75 mg at 03/25/17 0019     clindamycin (CLEOCIN) infusion 900 mg  900 mg Intravenous Q8H 50 mL/hr at 03/25/17 0506 900 mg at 03/25/17 0506     levofloxacin (LEVAQUIN) infusion 750 mg  750 mg Intravenous Q24H 100 mL/hr at 03/24/17 1440 750 mg at 03/24/17 1440     ibuprofen (ADVIL/MOTRIN) tablet 600 mg  600 mg Oral Q8H PRN   600 mg at 03/25/17 0232     vancomycin (VANCOCIN) 1,250 mg in NaCl 0.9 % 250 mL intermittent infusion  1,250 mg Intravenous Q12H 125 mL/hr at 03/25/17 0236 1,250 mg at 03/25/17 0236     enoxaparin (LOVENOX) injection 40 mg  40 mg Subcutaneous Q24H   40 mg at 03/24/17 1751     traMADol (ULTRAM) tablet 50 mg  50 mg Oral Q6H PRN   50 mg at 03/24/17 1750     naloxone (NARCAN) injection 0.1-0.4 mg  0.1-0.4 mg Intravenous Q2 Min PRN         DOPamine  400 mg in dextrose 5% 250 mL (adult std) - premix  2-20 mcg/kg/min Intravenous Continuous 4.7 mL/hr at 03/25/17 0842 2 mcg/kg/min at 03/25/17 0842     ondansetron (ZOFRAN) injection 8 mg  8 mg Intravenous Q8H PRN   8 mg at 03/25/17 0606     diphenhydrAMINE (BENADRYL) capsule 25 mg  25 mg Oral At Bedtime PRN        Or     diphenhydrAMINE (BENADRYL) injection 25 mg  25 mg Intravenous Q6H PRN         No current [facility]-ordered outpatient prescriptions on file.       Review of Systems:   C:as above  E/M: NEGATIVE for ear, mouth and throat problems  R: winded  CV: NEGATIVE for chest pain, palpitations or peripheral edema               Physical Exam:   Vitals were reviewed  Patient Vitals for the past 24 hrs:   BP Temp Temp src Pulse Heart Rate Resp SpO2 Height Weight   03/25/17 0800 - - - - - - 97 % - -   03/25/17 0500 - 99.3  F (37.4  C) Tympanic 95 - - - - -   03/25/17 0400 101/51 - - - 95 19 93 % - -   03/25/17 0345 (!) 83/60 - - - 100 18 (!) 91 % - -   03/25/17 0330 94/56 - - - 96 18 93 % - -   03/25/17 0315 98/61 - - - 100 18 94 % - -   03/25/17 0300 98/58 - - - 100 23 94 % - -   03/25/17 0245 95/63 - - - 99 22 93 % - -   03/25/17 0230 97/62 - - - 97 (!) 29 93 % - -   03/25/17 0215 102/73 - - - 112 19 (!) 91 % - -   03/25/17 0200 104/60 99.6  F (37.6  C) Tympanic - 96 (!) 32 94 % - -   03/25/17 0145 101/65 - - - 96 21 93 % - -   03/25/17 0130 106/62 - - - 87 20 (!) 91 % - -   03/25/17 0115 106/65 - - - 89 (!) 28 (!) 90 % - -   03/25/17 0100 (!) 88/64 - - - 92 (!) 33 92 % - -   03/25/17 0045 115/65 - - - 87 (!) 31 (!) 90 % - -   03/25/17 0030 102/68 - - - 85 (!) 29 (!) 90 % - -   03/25/17 0020 (!) 83/57 98.4  F (36.9  C) Tympanic 91 94 20 92 % - -   03/25/17 0015 (!) 82/54 - - - 96 25 94 % - -   03/25/17 0012 (!) 82/56 - - - 92 (!) 34 93 % - -   03/25/17 0000 (!) 88/55 - - - 93 (!) 11 94 % - -   03/24/17 2355 (!) 84/59 - - - 91 18 93 % - -   03/24/17 2345 (!) 80/58 - - - 87 (!) 38 94 % - -   03/24/17 2330 (!) 83/62 - -  "- 92 (!) 29 93 % - -   03/24/17 2315 (!) 81/56 - - - 96 (!) 30 93 % - -   03/24/17 2300 (!) 84/58 - - - 95 19 93 % - -   03/24/17 2245 (!) 85/59 - - - 96 21 95 % - -   03/24/17 2239 - - - - - (!) 28 94 % - -   03/24/17 2238 - - - - - (!) 28 95 % - -   03/24/17 2237 - - - - - 19 95 % - -   03/24/17 2236 - - - - - (!) 29 95 % - -   03/24/17 2235 (!) 80/57 98.9  F (37.2  C) Tympanic - 92 (!) 33 - - -   03/24/17 2205 (!) 77/54 - - - 93 - - - -   03/24/17 2012 (!) 81/46 - - - 92 - - - -   03/24/17 1848 - - - - 104 - 94 % - -   03/24/17 1756 - 99.3  F (37.4  C) Tympanic - - - - - -   03/24/17 1650 - - - - - - 93 % - -   03/24/17 1638 (!) 86/55 100  F (37.8  C) Tympanic - 104 20 - - -   03/24/17 1509 - 99.8  F (37.7  C) Tympanic - - - - - -   03/24/17 1400 - - - - - - (!) 91 % - -   03/24/17 1345 91/55 101.2  F (38.4  C) Tympanic - 110 (!) 28 - - -   03/24/17 1331 - - - - 114 - 93 % - -   03/24/17 1300 96/55 - - - 119 - - - -   03/24/17 1256 96/61 - - - 119 - - - -   03/24/17 1245 94/56 - - - (!) 121 - 94 % - -   03/24/17 1220 95/61 (!) 103.2  F (39.6  C) Tympanic - 120 - 96 % 5' 7\" (1.702 m) 138 lb 3.7 oz (62.7 kg)   03/24/17 1210 - - - - - - 95 % - -   03/24/17 1149 - - - - - - 93 % - -   03/24/17 1148 101/64 (!) 104.8  F (40.4  C) Tympanic - (!) 123 (!) 28 - - -   03/24/17 1137 99/60 - - - (!) 125 - - - -     Constitutional: Awake, alert, cooperative.  Eyes:  sclera clear  Lungs:diminished at left bases  Cardiovascular: Regular rate and rhythm, normal S1 and S2, no S3 or S4, and no murmur noted.  Abdomen: nondistended  Neurologic: Awake, alert, oriented to name, place and time.    Neuropsychiatric: Normal affect, mood, orientation, memory and insight.       Peripheral IV 03/24/17 Left (Active)   Site Assessment WDL 3/25/2017  2:00 AM   Line Status Infusing;Checked every 1-2 hour 3/25/2017 12:30 AM   Phlebitis Scale 0-->no symptoms 3/25/2017 12:30 AM   Infiltration Scale 0 3/25/2017 12:30 AM   Number of days:1     "   Peripheral IV 03/24/17 Right Hand (Active)   Site Assessment WDL 3/25/2017  2:00 AM   Line Status Infusing;Checked every 1-2 hour 3/25/2017 12:30 AM   Phlebitis Scale 0-->no symptoms 3/25/2017 12:30 AM   Infiltration Scale 0 3/25/2017 12:30 AM   Number of days:1     Line/device assessment(s) completed for medical necessity         Data:     Results for orders placed or performed during the hospital encounter of 03/24/17 (from the past 24 hour(s))   Lactic acid level STAT   Result Value Ref Range    Lactic Acid 4.0 (HH) 0.4 - 2.0 mmol/L   Blood culture   Result Value Ref Range    Specimen Description Blood Right Arm     Culture Micro No growth after 17 hours     Micro Report Status Pending    Creatinine   Result Value Ref Range    Creatinine 0.81 0.52 - 1.04 mg/dL    GFR Estimate 78 >60 mL/min/1.7m2    GFR Estimate If Black >90   GFR Calc   >60 mL/min/1.7m2   Blood culture   Result Value Ref Range    Specimen Description Blood Right Arm     Culture Micro No growth after 17 hours     Micro Report Status Pending    Lactic acid   Result Value Ref Range    Lactic Acid 2.3 (H) 0.4 - 2.0 mmol/L   Lactic acid   Result Value Ref Range    Lactic Acid 1.8 0.4 - 2.0 mmol/L   CBC with platelets differential   Result Value Ref Range    WBC 2.2 (L) 4.0 - 11.0 10e9/L    RBC Count 3.33 (L) 3.8 - 5.2 10e12/L    Hemoglobin 10.6 (L) 11.7 - 15.7 g/dL    Hematocrit 29.6 (L) 35.0 - 47.0 %    MCV 89 78 - 100 fl    MCH 31.8 26.5 - 33.0 pg    MCHC 35.8 31.5 - 36.5 g/dL    RDW 12.7 10.0 - 15.0 %    Platelet Count 63 (L) 150 - 450 10e9/L    Diff Method Manual Differential     % Neutrophils 49.0 %    % Lymphocytes 28.0 %    % Monocytes 2.0 %    % Eosinophils 0.0 %    % Basophils 0.0 %    % Band 13.0 %    % Metamyelocytes 6.0 %    % Myelocytes 2.0 %    Absolute Neutrophil 1.1 (L) 1.6 - 8.3 10e9/L    Absolute Lymphocytes 0.6 (L) 0.8 - 5.3 10e9/L    Absolute Monocytes 0.0 0.0 - 1.3 10e9/L    Absolute Eosinophils 0.0 0.0 - 0.7  10e9/L    Absolute Basophils 0.0 0.0 - 0.2 10e9/L    Absolute Bands 0.3 0.0 - 0.6 10e9/L    Absolute Metamyelocytes 0.1 (H) 0 10e9/L    Absolute Myelocytes 0.0 0 10e9/L   Comprehensive metabolic panel   Result Value Ref Range    Sodium 132 (L) 133 - 144 mmol/L    Potassium 3.4 3.4 - 5.3 mmol/L    Chloride 102 94 - 109 mmol/L    Carbon Dioxide 20 20 - 32 mmol/L    Anion Gap 10 3 - 14 mmol/L    Glucose 96 70 - 99 mg/dL    Urea Nitrogen 9 7 - 30 mg/dL    Creatinine 0.66 0.52 - 1.04 mg/dL    GFR Estimate >90  Non  GFR Calc   >60 mL/min/1.7m2    GFR Estimate If Black >90   GFR Calc   >60 mL/min/1.7m2    Calcium 6.9 (L) 8.5 - 10.1 mg/dL    Bilirubin Total 0.6 0.2 - 1.3 mg/dL    Albumin 2.2 (L) 3.4 - 5.0 g/dL    Protein Total 4.8 (L) 6.8 - 8.8 g/dL    Alkaline Phosphatase 21 (L) 40 - 150 U/L    ALT 15 0 - 50 U/L    AST 19 0 - 45 U/L   CBC with platelets differential   Result Value Ref Range    WBC 3.4 (L) 4.0 - 11.0 10e9/L    RBC Count 3.61 (L) 3.8 - 5.2 10e12/L    Hemoglobin 11.5 (L) 11.7 - 15.7 g/dL    Hematocrit 32.0 (L) 35.0 - 47.0 %    MCV 89 78 - 100 fl    MCH 31.9 26.5 - 33.0 pg    MCHC 35.9 31.5 - 36.5 g/dL    RDW 12.8 10.0 - 15.0 %    Platelet Count 76 (L) 150 - 450 10e9/L    Diff Method Manual Differential     % Neutrophils 56.0 %    % Lymphocytes 27.0 %    % Monocytes 1.0 %    % Eosinophils 0.0 %    % Basophils 0.0 %    % Band 8.0 %    % Metamyelocytes 6.0 %    % Myelocytes 2.0 %    Absolute Neutrophil 1.9 1.6 - 8.3 10e9/L    Absolute Lymphocytes 0.9 0.8 - 5.3 10e9/L    Absolute Monocytes 0.0 0.0 - 1.3 10e9/L    Absolute Eosinophils 0.0 0.0 - 0.7 10e9/L    Absolute Basophils 0.0 0.0 - 0.2 10e9/L    Absolute Bands 0.3 0.0 - 0.6 10e9/L    Absolute Metamyelocytes 0.2 (H) 0 10e9/L    Absolute Myelocytes 0.1 (H) 0 10e9/L   Comprehensive metabolic panel   Result Value Ref Range    Sodium 141 133 - 144 mmol/L    Potassium 3.1 (L) 3.4 - 5.3 mmol/L    Chloride 109 94 - 109 mmol/L    Carbon  Dioxide 24 20 - 32 mmol/L    Anion Gap 8 3 - 14 mmol/L    Glucose 86 70 - 99 mg/dL    Urea Nitrogen 8 7 - 30 mg/dL    Creatinine 0.64 0.52 - 1.04 mg/dL    GFR Estimate >90  Non  GFR Calc   >60 mL/min/1.7m2    GFR Estimate If Black >90   GFR Calc   >60 mL/min/1.7m2    Calcium 7.2 (L) 8.5 - 10.1 mg/dL    Bilirubin Total 0.5 0.2 - 1.3 mg/dL    Albumin 2.2 (L) 3.4 - 5.0 g/dL    Protein Total 4.8 (L) 6.8 - 8.8 g/dL    Alkaline Phosphatase 22 (L) 40 - 150 U/L    ALT 16 0 - 50 U/L    AST 19 0 - 45 U/L     All cardiac studies reviewed by me.  All imaging studies reviewed by me.         Assessment and Plan:   Principal Problem:    Pneumonia of left lower lobe due to infectious organism    Doing a bit better, will continue current coarse of treatment w/ antibiotics, tamiflu  Active Problems:    Sepsis due to other etiology (H)   unable yet to wean dopamine. Continue to try to wean. Continue iv fluids. Replace K today    Hypoxia  Supportive care

## 2017-03-25 NOTE — PLAN OF CARE
Problem: Patient Goal: Rt Focus  Goal: 1. Patient Goal: RT Focus  Outcome: No Change  Patient using IS as instructed.

## 2017-03-25 NOTE — PLAN OF CARE
Problem: Goal Outcome Summary  Goal: Goal Outcome Summary  Outcome: No Change  Unable to wean off dopamine drip.  BP stable with drip.  Temp 99.9 this am and was 101 at 1700.  Taking ibuprofen and tramadol for body aches due to being in bed.  Up to commode with stand by assist of one.  Voiding good amounts clear light yellow urine.  Was having loose watery stools that stopped after two doses of lomotil.  Has had emesis 3 times this shift, for a total of 75 ml.  Antiemetic given.  Remains on droplet isolation.  Patient states she feels nauseated with each tamiflu dose and does not want to take it anymore.

## 2017-03-26 LAB
ANION GAP SERPL CALCULATED.3IONS-SCNC: 9 MMOL/L (ref 3–14)
BUN SERPL-MCNC: 4 MG/DL (ref 7–30)
CALCIUM SERPL-MCNC: 7.5 MG/DL (ref 8.5–10.1)
CHLORIDE SERPL-SCNC: 108 MMOL/L (ref 94–109)
CO2 SERPL-SCNC: 24 MMOL/L (ref 20–32)
CREAT SERPL-MCNC: 0.56 MG/DL (ref 0.52–1.04)
ERYTHROCYTE [DISTWIDTH] IN BLOOD BY AUTOMATED COUNT: 13.2 % (ref 10–15)
GFR SERPL CREATININE-BSD FRML MDRD: ABNORMAL ML/MIN/1.7M2
GLUCOSE SERPL-MCNC: 69 MG/DL (ref 70–99)
HCT VFR BLD AUTO: 26.2 % (ref 35–47)
HGB BLD-MCNC: 9.3 G/DL (ref 11.7–15.7)
MCH RBC QN AUTO: 32 PG (ref 26.5–33)
MCHC RBC AUTO-ENTMCNC: 35.5 G/DL (ref 31.5–36.5)
MCV RBC AUTO: 90 FL (ref 78–100)
PLATELET # BLD AUTO: 77 10E9/L (ref 150–450)
POTASSIUM SERPL-SCNC: 3.3 MMOL/L (ref 3.4–5.3)
POTASSIUM SERPL-SCNC: 3.3 MMOL/L (ref 3.4–5.3)
RBC # BLD AUTO: 2.91 10E12/L (ref 3.8–5.2)
SODIUM SERPL-SCNC: 141 MMOL/L (ref 133–144)
VANCOMYCIN SERPL-MCNC: 10 MG/L
WBC # BLD AUTO: 6.5 10E9/L (ref 4–11)

## 2017-03-26 PROCEDURE — 25000128 H RX IP 250 OP 636: Performed by: FAMILY MEDICINE

## 2017-03-26 PROCEDURE — S0077 INJECTION, CLINDAMYCIN PHOSP: HCPCS | Performed by: FAMILY MEDICINE

## 2017-03-26 PROCEDURE — 85027 COMPLETE CBC AUTOMATED: CPT | Performed by: FAMILY MEDICINE

## 2017-03-26 PROCEDURE — 25800025 ZZH RX 258: Performed by: FAMILY MEDICINE

## 2017-03-26 PROCEDURE — 25000132 ZZH RX MED GY IP 250 OP 250 PS 637: Performed by: FAMILY MEDICINE

## 2017-03-26 PROCEDURE — S0028 INJECTION, FAMOTIDINE, 20 MG: HCPCS | Performed by: FAMILY MEDICINE

## 2017-03-26 PROCEDURE — 40000275 ZZH STATISTIC RCP TIME EA 10 MIN

## 2017-03-26 PROCEDURE — 36415 COLL VENOUS BLD VENIPUNCTURE: CPT | Performed by: FAMILY MEDICINE

## 2017-03-26 PROCEDURE — 80048 BASIC METABOLIC PNL TOTAL CA: CPT | Performed by: FAMILY MEDICINE

## 2017-03-26 PROCEDURE — 25000125 ZZHC RX 250: Performed by: FAMILY MEDICINE

## 2017-03-26 PROCEDURE — 12000000 ZZH R&B MED SURG/OB

## 2017-03-26 PROCEDURE — 80202 ASSAY OF VANCOMYCIN: CPT | Performed by: FAMILY MEDICINE

## 2017-03-26 PROCEDURE — 84132 ASSAY OF SERUM POTASSIUM: CPT | Performed by: FAMILY MEDICINE

## 2017-03-26 RX ORDER — CODEINE PHOSPHATE AND GUAIFENESIN 10; 100 MG/5ML; MG/5ML
10 SOLUTION ORAL EVERY 4 HOURS PRN
Status: DISCONTINUED | OUTPATIENT
Start: 2017-03-26 | End: 2017-03-28 | Stop reason: HOSPADM

## 2017-03-26 RX ADMIN — CLINDAMYCIN IN 5 PERCENT DEXTROSE 900 MG: 18 INJECTION, SOLUTION INTRAVENOUS at 21:17

## 2017-03-26 RX ADMIN — GUAIFENESIN AND CODEINE PHOSPHATE 10 ML: 10; 100 LIQUID ORAL at 23:56

## 2017-03-26 RX ADMIN — TRAMADOL HYDROCHLORIDE 50 MG: 50 TABLET, COATED ORAL at 04:39

## 2017-03-26 RX ADMIN — POTASSIUM CHLORIDE 40 MEQ: 20 TABLET, EXTENDED RELEASE ORAL at 11:16

## 2017-03-26 RX ADMIN — VANCOMYCIN HYDROCHLORIDE 1250 MG: 1 INJECTION, POWDER, LYOPHILIZED, FOR SOLUTION INTRAVENOUS at 02:27

## 2017-03-26 RX ADMIN — ONDANSETRON 8 MG: 2 INJECTION INTRAMUSCULAR; INTRAVENOUS at 05:30

## 2017-03-26 RX ADMIN — VANCOMYCIN HYDROCHLORIDE 1250 MG: 1 INJECTION, POWDER, LYOPHILIZED, FOR SOLUTION INTRAVENOUS at 14:56

## 2017-03-26 RX ADMIN — GUAIFENESIN AND CODEINE PHOSPHATE 10 ML: 10; 100 LIQUID ORAL at 11:17

## 2017-03-26 RX ADMIN — OSELTAMIVIR PHOSPHATE 75 MG: 75 CAPSULE ORAL at 21:17

## 2017-03-26 RX ADMIN — POTASSIUM CHLORIDE 10 MEQ: 14.9 INJECTION, SOLUTION, CONCENTRATE PARENTERAL at 23:25

## 2017-03-26 RX ADMIN — POTASSIUM CHLORIDE 10 MEQ: 14.9 INJECTION, SOLUTION, CONCENTRATE PARENTERAL at 20:07

## 2017-03-26 RX ADMIN — SODIUM CHLORIDE, POTASSIUM CHLORIDE, SODIUM LACTATE AND CALCIUM CHLORIDE: 600; 310; 30; 20 INJECTION, SOLUTION INTRAVENOUS at 05:19

## 2017-03-26 RX ADMIN — POTASSIUM CHLORIDE 10 MEQ: 14.9 INJECTION, SOLUTION, CONCENTRATE PARENTERAL at 21:17

## 2017-03-26 RX ADMIN — FAMOTIDINE 20 MG: 10 INJECTION, SOLUTION INTRAVENOUS at 09:57

## 2017-03-26 RX ADMIN — IBUPROFEN 600 MG: 600 TABLET ORAL at 08:16

## 2017-03-26 RX ADMIN — GUAIFENESIN AND CODEINE PHOSPHATE 10 ML: 10; 100 LIQUID ORAL at 18:36

## 2017-03-26 RX ADMIN — DOPAMINE HYDROCHLORIDE 4 MCG/KG/MIN: 160 INJECTION, SOLUTION INTRAVENOUS at 02:36

## 2017-03-26 RX ADMIN — POTASSIUM CHLORIDE 10 MEQ: 14.9 INJECTION, SOLUTION, CONCENTRATE PARENTERAL at 22:21

## 2017-03-26 RX ADMIN — POTASSIUM CHLORIDE 20 MEQ: 20 TABLET, EXTENDED RELEASE ORAL at 14:55

## 2017-03-26 RX ADMIN — FAMOTIDINE 20 MG: 10 INJECTION, SOLUTION INTRAVENOUS at 21:14

## 2017-03-26 RX ADMIN — LEVOFLOXACIN 750 MG: 5 INJECTION, SOLUTION INTRAVENOUS at 13:43

## 2017-03-26 RX ADMIN — VANCOMYCIN HYDROCHLORIDE 1250 MG: 1 INJECTION, POWDER, LYOPHILIZED, FOR SOLUTION INTRAVENOUS at 22:21

## 2017-03-26 RX ADMIN — ONDANSETRON 8 MG: 2 INJECTION INTRAMUSCULAR; INTRAVENOUS at 15:25

## 2017-03-26 RX ADMIN — CLINDAMYCIN IN 5 PERCENT DEXTROSE 900 MG: 18 INJECTION, SOLUTION INTRAVENOUS at 12:29

## 2017-03-26 RX ADMIN — CLINDAMYCIN IN 5 PERCENT DEXTROSE 900 MG: 18 INJECTION, SOLUTION INTRAVENOUS at 05:20

## 2017-03-26 ASSESSMENT — PAIN DESCRIPTION - DESCRIPTORS
DESCRIPTORS: SORE
DESCRIPTORS: ACHING

## 2017-03-26 NOTE — PLAN OF CARE
Face to face report given with opportunity to observe patient.    Report given to CHERYL Jackson   3/26/2017  6:59 AM

## 2017-03-26 NOTE — PLAN OF CARE
Problem: Patient Goal: Rt Focus  Goal: 1. Patient Goal: RT Focus  Pt. Uses IS frequently and independently

## 2017-03-26 NOTE — PLAN OF CARE
Problem: Patient Goal: Social Work Focus  Goal: 1. Patient Goal: Social Work Focus  Briefly met with Sofy, feeling better. No needs identified. Will remain available for discharge planning.

## 2017-03-26 NOTE — PLAN OF CARE
Turned oxygen off.  Dopamine decreased to 2 mcg/kg/min. When up to commode her sat's stayed up to 95 % RA. BP stable at 100's/70's

## 2017-03-26 NOTE — PROGRESS NOTES
[facility]  Progress Note            Subjective:   Feeling much better. Cough is diminished but wants syrup for when she is supine.  appetitie slowly improving. Less achy                 Medications:     Current Facility-Administered Medications Ordered in Epic   Medication Dose Route Frequency Last Rate Last Dose     lactated ringers infusion   Intravenous Continuous 150 mL/hr at 03/26/17 0519       potassium chloride SA (K-DUR/KLOR-CON M) CR tablet 20-40 mEq  20-40 mEq Oral Q2H PRN         potassium chloride (KLOR-CON) Packet 20-40 mEq  20-40 mEq Oral or Feeding Tube Q2H PRN         potassium chloride 10 mEq in 100 mL intermittent infusion  10 mEq Intravenous Q1H PRN         potassium chloride 10 mEq in 100 mL intermittent infusion with 10 mg lidocaine  10 mEq Intravenous Q1H  mL/hr at 03/25/17 1804 10 mEq at 03/25/17 1804     loperamide (IMODIUM) capsule 2 mg  2 mg Oral 4x Daily PRN   2 mg at 03/25/17 1816     famotidine (PEPCID) injection 20 mg  20 mg Intravenous BID   20 mg at 03/26/17 0957     alum & mag hydroxide-simethicone (MYLANTA ES/MAALOX  ES) suspension 15 mL  15 mL Oral Q4H PRN   15 mL at 03/25/17 0916     acetaminophen (TYLENOL) tablet 650 mg  650 mg Oral Q4H PRN   650 mg at 03/24/17 1858     oseltamivir (TAMIFLU) capsule 75 mg  75 mg Oral BID   75 mg at 03/25/17 0859     clindamycin (CLEOCIN) infusion 900 mg  900 mg Intravenous Q8H 50 mL/hr at 03/26/17 0520 900 mg at 03/26/17 0520     levofloxacin (LEVAQUIN) infusion 750 mg  750 mg Intravenous Q24H 100 mL/hr at 03/25/17 1305 750 mg at 03/25/17 1305     ibuprofen (ADVIL/MOTRIN) tablet 600 mg  600 mg Oral Q8H PRN   600 mg at 03/26/17 0816     vancomycin (VANCOCIN) 1,250 mg in NaCl 0.9 % 250 mL intermittent infusion  1,250 mg Intravenous Q12H 125 mL/hr at 03/26/17 0227 1,250 mg at 03/26/17 0227     enoxaparin (LOVENOX) injection 40 mg  40 mg Subcutaneous Q24H   40 mg at 03/25/17 1712     traMADol (ULTRAM) tablet 50 mg  50 mg Oral Q6H  PRN   50 mg at 03/26/17 0439     naloxone (NARCAN) injection 0.1-0.4 mg  0.1-0.4 mg Intravenous Q2 Min PRN         DOPamine 400 mg in dextrose 5% 250 mL (adult std) - premix  2-20 mcg/kg/min Intravenous Continuous   Stopped at 03/26/17 0755     ondansetron (ZOFRAN) injection 8 mg  8 mg Intravenous Q8H PRN   8 mg at 03/26/17 0530     diphenhydrAMINE (BENADRYL) capsule 25 mg  25 mg Oral At Bedtime PRN        Or     diphenhydrAMINE (BENADRYL) injection 25 mg  25 mg Intravenous Q6H PRN         No current [facility]-ordered outpatient prescriptions on file.       Review of Systems:   C: NEGATIVE for fever, chills, change in weight  E/M: NEGATIVE for ear, mouth and throat problems  R: as above  CV: NEGATIVE for chest pain, palpitations or peripheral edema               Physical Exam:   Vitals were reviewed  Patient Vitals for the past 24 hrs:   BP Temp Temp src Pulse Heart Rate Resp SpO2   03/26/17 0800 - - - - - - 95 %   03/26/17 0755 - 100.6  F (38.1  C) Tympanic - - - -   03/26/17 0630 102/73 - - - 82 - 94 %   03/26/17 0615 - - - - - - 94 %   03/26/17 0600 100/71 - - - 79 - 93 %   03/26/17 0545 - - - - - - 92 %   03/26/17 0530 104/74 - - - 82 (!) 28 94 %   03/26/17 0520 - 99.2  F (37.3  C) - - - - -   03/26/17 0515 - - - - - 25 92 %   03/26/17 0500 100/73 - - - 89 (!) 27 92 %   03/26/17 0445 - - - - - 19 93 %   03/26/17 0439 - - - - - - (!) 91 %   03/26/17 0430 128/78 - - - 65 17 100 %   03/26/17 0415 - - - - - 16 92 %   03/26/17 0400 131/74 100.1  F (37.8  C) Tympanic - 67 - 95 %   03/26/17 0345 - - - - - 17 94 %   03/26/17 0330 125/77 - - - 67 14 93 %   03/26/17 0300 117/77 - - - 70 16 -   03/26/17 0230 112/75 - - - 83 19 95 %   03/26/17 0200 118/75 99.3  F (37.4  C) Tympanic 73 70 15 -   03/26/17 0130 109/66 - - - 81 - -   03/26/17 0100 107/76 - - - 85 25 93 %   03/26/17 0030 120/80 - - - 80 15 94 %   03/26/17 0000 111/65 - - - 73 20 94 %   03/25/17 2145 - - - - - (!) 30 94 %   03/25/17 2130 109/63 - - - 87 15 95 %    03/25/17 2115 95/55 - - - 94 (!) 8 95 %   03/25/17 2000 98/62 99.8  F (37.7  C) Tympanic 98 89 (!) 10 94 %   03/25/17 1945 - - - - - 12 94 %   03/25/17 1930 111/68 - - - 84 12 92 %   03/25/17 1900 93/53 - - - 85 16 94 %   03/25/17 1800 - 100.6  F (38.1  C) Tympanic - - - -   03/25/17 1700 - 101  F (38.3  C) Tympanic - - - -   03/25/17 1600 - - - - - - 96 %   03/25/17 1100 - - - - - - 98 %     Constitutional: Awake, alert, cooperative.  Eyes:  sclera clear  Lungs: improved air mvt thru left lower lobe no rales/wheezes/rhonchi  Cardiovascular: Regular rate and rhythm, normal S1 and S2, no S3 or S4, and no murmur noted.  Abdomen: nondistended  Neurologic: Awake, alert, oriented to name, place and time.    Neuropsychiatric: Normal affect, mood, orientation, memory and insight.  Skin: No rashes      Peripheral IV 03/24/17 Left (Active)   Site Assessment WDL 3/26/2017  6:00 AM   Line Status Infusing;Checked every 1-2 hour 3/26/2017 12:00 AM   Phlebitis Scale 0-->no symptoms 3/26/2017 12:00 AM   Infiltration Scale 0 3/26/2017 12:00 AM   Extravasation? No 3/25/2017  9:00 AM   Number of days:2       Peripheral IV 03/24/17 Right Hand (Active)   Site Assessment [facility] 3/26/2017  6:00 AM   Line Status Infusing;Checked every 1-2 hour 3/26/2017  2:00 AM   Phlebitis Scale 0-->no symptoms 3/26/2017  2:00 AM   Infiltration Scale 0 3/26/2017  2:00 AM   Extravasation? No 3/25/2017  9:00 AM   Number of days:2     Line/device assessment(s) completed for medical necessity         Data:     Results for orders placed or performed during the hospital encounter of 03/24/17 (from the past 24 hour(s))   Potassium   Result Value Ref Range    Potassium 3.4 3.4 - 5.3 mmol/L   CBC with platelets   Result Value Ref Range    WBC 6.5 4.0 - 11.0 10e9/L    RBC Count 2.91 (L) 3.8 - 5.2 10e12/L    Hemoglobin 9.3 (L) 11.7 - 15.7 g/dL    Hematocrit 26.2 (L) 35.0 - 47.0 %    MCV 90 78 - 100 fl    MCH 32.0 26.5 - 33.0 pg    MCHC 35.5 31.5 - 36.5 g/dL    RDW  13.2 10.0 - 15.0 %    Platelet Count 77 (L) 150 - 450 10e9/L   Basic metabolic panel   Result Value Ref Range    Sodium 141 133 - 144 mmol/L    Potassium 3.3 (L) 3.4 - 5.3 mmol/L    Chloride 108 94 - 109 mmol/L    Carbon Dioxide 24 20 - 32 mmol/L    Anion Gap 9 3 - 14 mmol/L    Glucose 69 (L) 70 - 99 mg/dL    Urea Nitrogen 4 (L) 7 - 30 mg/dL    Creatinine 0.56 0.52 - 1.04 mg/dL    GFR Estimate >90  Non  GFR Calc   >60 mL/min/1.7m2    GFR Estimate If Black >90   GFR Calc   >60 mL/min/1.7m2    Calcium 7.5 (L) 8.5 - 10.1 mg/dL     All cardiac studies reviewed by me.  All imaging studies reviewed by me.         Assessment and Plan:   Principal Problem:    Pneumonia of left lower lobe due to infectious organism   continue antibiotics. Off pressors. Monitor bp and if stable through early afternoon will decrease her rate. hgb drop is likely dilutional from fluids. Cont tamiflu. Pt improving  Active Problems:    Sepsis due to other etiology (H)    As above    Hypoxia   resolved    dvt prophyl-plt went down. Slow to improve. Increasing activity by pt. Will stop lovenox

## 2017-03-26 NOTE — PLAN OF CARE
Problem: Goal Outcome Summary  Goal: Goal Outcome Summary  Outcome: Improving  Last temp at 99.3 .  Dopamine continues at 4 mcg/kg/min. BP's stable . 0400 -131/74 map 93. Patient c/o headache which tramadol relieved her pain .  No changes noted per Cardiac monitor .  No stools noted this shift.  Patient refused the Tamiflu r/t it was making her nausea/vomiting. THADDEUS,LLL clear .    Problem: Pneumonia (Adult)  Intervention: Monitor/Manage Fluid Electrolyte Balance    03/26/17 0359   Positioning   Body Position independently positioning;neutral body alignment

## 2017-03-26 NOTE — PHARMACY-VANCOMYCIN DOSING SERVICE
Pharmacy Vancomycin Note  Date of Service 2017  Patient's  1975   41 year old, female    Indication: Pneumonia, suspected MRSA  Goal Trough Level: 15-20 mg/L  Day of Therapy: 3  Current Vancomycin regimen:  1250 mg IV q12h    Current estimated CrCl = Estimated Creatinine Clearance: 133.5 mL/min (based on Cr of 0.56).    Creatinine for last 3 days  3/24/2017: 12:04 PM Creatinine 0.81 mg/dL; 11:53 PM Creatinine 0.66 mg/dL  3/25/2017:  4:55 AM Creatinine 0.64 mg/dL  3/26/2017:  5:05 AM Creatinine 0.56 mg/dL    Recent Vancomycin Levels (past 3 days)  3/26/2017:  1:24 PM Vancomycin Level 10.0 mg/L    Vancomycin IV Administrations (past 72 hours)                   vancomycin (VANCOCIN) 1,250 mg in NaCl 0.9 % 250 mL intermittent infusion (mg) 1,250 mg New Bag 17 0227     1,250 mg New Bag 17 1542     1,250 mg New Bag  0236     1,250 mg New Bag 17 1631                Nephrotoxins and other renal medications (Future)    Start     Dose/Rate Route Frequency Ordered Stop    17 1415  vancomycin (VANCOCIN) 1,250 mg in NaCl 0.9 % 250 mL intermittent infusion      1,250 mg  192 mL/hr over 90 Minutes Intravenous EVERY 8 HOURS 17 1409      17 1401  ibuprofen (ADVIL/MOTRIN) tablet 600 mg      600 mg Oral EVERY 8 HOURS PRN 17 1402               Contrast Orders - past 72 hours     None          Interpretation of levels and current regimen:  Trough level is  Subtherapeutic    Has serum creatinine changed > 50% in last 72 hours: No    Renal Function: Stable    Plan:  1.  Increase Dose to 1250 mg IV every 8 hours  2.  Pharmacy will check trough levels as appropriate in 1-3 Days.    3. Serum creatinine levels will be ordered daily for the first week of therapy and at least twice weekly for subsequent weeks.      Nikki Macdonald        .

## 2017-03-26 NOTE — PLAN OF CARE
Face to face report given with opportunity to observe patient.  Report given to Dedra VELARDE RN.    Renetta Hernandez  3/25/2017, 7:02 PM

## 2017-03-27 ENCOUNTER — RESULTS ONLY (OUTPATIENT)
Dept: EMERGENCY MEDICINE | Facility: HOSPITAL | Age: 42
End: 2017-03-27

## 2017-03-27 PROBLEM — A41.9 SEPSIS (H): Status: RESOLVED | Noted: 2017-03-24 | Resolved: 2017-03-27

## 2017-03-27 LAB
ANION GAP SERPL CALCULATED.3IONS-SCNC: 8 MMOL/L (ref 3–14)
BUN SERPL-MCNC: 7 MG/DL (ref 7–30)
CALCIUM SERPL-MCNC: 7.5 MG/DL (ref 8.5–10.1)
CHLORIDE SERPL-SCNC: 111 MMOL/L (ref 94–109)
CO2 SERPL-SCNC: 22 MMOL/L (ref 20–32)
CREAT SERPL-MCNC: 0.74 MG/DL (ref 0.52–1.04)
ERYTHROCYTE [DISTWIDTH] IN BLOOD BY AUTOMATED COUNT: 13.7 % (ref 10–15)
GFR SERPL CREATININE-BSD FRML MDRD: 87 ML/MIN/1.7M2
GLUCOSE SERPL-MCNC: 91 MG/DL (ref 70–99)
HCT VFR BLD AUTO: 29.4 % (ref 35–47)
HGB BLD-MCNC: 10.3 G/DL (ref 11.7–15.7)
MCH RBC QN AUTO: 31.3 PG (ref 26.5–33)
MCHC RBC AUTO-ENTMCNC: 35 G/DL (ref 31.5–36.5)
MCV RBC AUTO: 89 FL (ref 78–100)
PLATELET # BLD AUTO: 107 10E9/L (ref 150–450)
POTASSIUM SERPL-SCNC: 3.9 MMOL/L (ref 3.4–5.3)
POTASSIUM SERPL-SCNC: 4 MMOL/L (ref 3.4–5.3)
RBC # BLD AUTO: 3.29 10E12/L (ref 3.8–5.2)
SODIUM SERPL-SCNC: 141 MMOL/L (ref 133–144)
VANCOMYCIN SERPL-MCNC: 24.5 MG/L
WBC # BLD AUTO: 8.7 10E9/L (ref 4–11)

## 2017-03-27 PROCEDURE — 12000000 ZZH R&B MED SURG/OB

## 2017-03-27 PROCEDURE — 25000132 ZZH RX MED GY IP 250 OP 250 PS 637: Performed by: FAMILY MEDICINE

## 2017-03-27 PROCEDURE — 36415 COLL VENOUS BLD VENIPUNCTURE: CPT | Performed by: FAMILY MEDICINE

## 2017-03-27 PROCEDURE — 80048 BASIC METABOLIC PNL TOTAL CA: CPT | Performed by: FAMILY MEDICINE

## 2017-03-27 PROCEDURE — 71020 ZZHC CHEST TWO VIEWS, FRONT/LAT: CPT | Mod: TC

## 2017-03-27 PROCEDURE — 84132 ASSAY OF SERUM POTASSIUM: CPT | Performed by: FAMILY MEDICINE

## 2017-03-27 PROCEDURE — 25000125 ZZHC RX 250: Performed by: FAMILY MEDICINE

## 2017-03-27 PROCEDURE — S0028 INJECTION, FAMOTIDINE, 20 MG: HCPCS | Performed by: FAMILY MEDICINE

## 2017-03-27 PROCEDURE — 25000128 H RX IP 250 OP 636: Performed by: FAMILY MEDICINE

## 2017-03-27 PROCEDURE — 25800025 ZZH RX 258: Performed by: FAMILY MEDICINE

## 2017-03-27 PROCEDURE — 40000275 ZZH STATISTIC RCP TIME EA 10 MIN

## 2017-03-27 PROCEDURE — 85027 COMPLETE CBC AUTOMATED: CPT | Performed by: FAMILY MEDICINE

## 2017-03-27 PROCEDURE — 80202 ASSAY OF VANCOMYCIN: CPT | Performed by: FAMILY MEDICINE

## 2017-03-27 PROCEDURE — S0077 INJECTION, CLINDAMYCIN PHOSP: HCPCS | Performed by: FAMILY MEDICINE

## 2017-03-27 RX ORDER — VANCOMYCIN HYDROCHLORIDE 1 G/200ML
1000 INJECTION, SOLUTION INTRAVENOUS EVERY 8 HOURS
Status: DISCONTINUED | OUTPATIENT
Start: 2017-03-27 | End: 2017-03-28 | Stop reason: HOSPADM

## 2017-03-27 RX ADMIN — VANCOMYCIN HYDROCHLORIDE 1000 MG: 1 INJECTION, SOLUTION INTRAVENOUS at 23:26

## 2017-03-27 RX ADMIN — CLINDAMYCIN IN 5 PERCENT DEXTROSE 900 MG: 18 INJECTION, SOLUTION INTRAVENOUS at 20:52

## 2017-03-27 RX ADMIN — LEVOFLOXACIN 750 MG: 5 INJECTION, SOLUTION INTRAVENOUS at 13:31

## 2017-03-27 RX ADMIN — CLINDAMYCIN IN 5 PERCENT DEXTROSE 900 MG: 18 INJECTION, SOLUTION INTRAVENOUS at 12:16

## 2017-03-27 RX ADMIN — LEVOFLOXACIN 750 MG: 5 INJECTION, SOLUTION INTRAVENOUS at 13:30

## 2017-03-27 RX ADMIN — DIPHENHYDRAMINE HYDROCHLORIDE 25 MG: 25 CAPSULE ORAL at 19:32

## 2017-03-27 RX ADMIN — SODIUM CHLORIDE, POTASSIUM CHLORIDE, SODIUM LACTATE AND CALCIUM CHLORIDE: 600; 310; 30; 20 INJECTION, SOLUTION INTRAVENOUS at 02:22

## 2017-03-27 RX ADMIN — VANCOMYCIN HYDROCHLORIDE 1250 MG: 1 INJECTION, POWDER, LYOPHILIZED, FOR SOLUTION INTRAVENOUS at 07:00

## 2017-03-27 RX ADMIN — FAMOTIDINE 20 MG: 10 INJECTION, SOLUTION INTRAVENOUS at 20:52

## 2017-03-27 RX ADMIN — VANCOMYCIN HYDROCHLORIDE 1000 MG: 1 INJECTION, SOLUTION INTRAVENOUS at 14:47

## 2017-03-27 RX ADMIN — DIPHENHYDRAMINE HYDROCHLORIDE 25 MG: 25 CAPSULE ORAL at 00:08

## 2017-03-27 RX ADMIN — LOPERAMIDE HYDROCHLORIDE 2 MG: 2 CAPSULE ORAL at 11:51

## 2017-03-27 RX ADMIN — ONDANSETRON 8 MG: 2 INJECTION INTRAMUSCULAR; INTRAVENOUS at 14:30

## 2017-03-27 RX ADMIN — IBUPROFEN 600 MG: 600 TABLET ORAL at 00:08

## 2017-03-27 RX ADMIN — IBUPROFEN 600 MG: 600 TABLET ORAL at 11:51

## 2017-03-27 RX ADMIN — ACETAMINOPHEN 650 MG: 325 TABLET, FILM COATED ORAL at 23:35

## 2017-03-27 RX ADMIN — FAMOTIDINE 20 MG: 10 INJECTION, SOLUTION INTRAVENOUS at 09:02

## 2017-03-27 RX ADMIN — CLINDAMYCIN IN 5 PERCENT DEXTROSE 900 MG: 18 INJECTION, SOLUTION INTRAVENOUS at 05:21

## 2017-03-27 RX ADMIN — TRAMADOL HYDROCHLORIDE 50 MG: 50 TABLET, COATED ORAL at 13:30

## 2017-03-27 ASSESSMENT — PAIN DESCRIPTION - DESCRIPTORS: DESCRIPTORS: ACHING

## 2017-03-27 NOTE — PLAN OF CARE
Face to face report given with opportunity to observe patient.    Report given to CHERYL Moore   3/27/2017  7:22 AM

## 2017-03-27 NOTE — PHARMACY-VANCOMYCIN DOSING SERVICE
Pharmacy Vancomycin Note  Date of Service 2017  Patient's  1975   41 year old, female    Indication: Community Acquired Pneumonia  Goal Trough Level: 15-20 mg/L  Day of Therapy: 4  Current Vancomycin regimen:  1250 mg IV q8h    Current estimated CrCl = Estimated Creatinine Clearance: 101 mL/min (based on Cr of 0.74).    Creatinine for last 3 days  3/24/2017: 11:53 PM Creatinine 0.66 mg/dL  3/25/2017:  4:55 AM Creatinine 0.64 mg/dL  3/26/2017:  5:05 AM Creatinine 0.56 mg/dL  3/27/2017:  5:07 AM Creatinine 0.74 mg/dL    Recent Vancomycin Levels (past 3 days)  3/26/2017:  1:24 PM Vancomycin Level 10.0 mg/L  3/27/2017:  1:15 PM Vancomycin Level 24.5 mg/L    Vancomycin IV Administrations (past 72 hours)                   vancomycin (VANCOCIN) 1,250 mg in NaCl 0.9 % 250 mL intermittent infusion (mg) 1,250 mg New Bag 17 0700     1,250 mg New Bag 17 2221     1,250 mg New Bag  1456    vancomycin (VANCOCIN) 1,250 mg in NaCl 0.9 % 250 mL intermittent infusion (mg) 1,250 mg New Bag 17 0227     1,250 mg New Bag 17 1542     1,250 mg New Bag  0236     1,250 mg New Bag 17 1631                Nephrotoxins and other renal medications (Future)    Start     Dose/Rate Route Frequency Ordered Stop    17 1515  vancomycin (VANCOCIN) 1000 mg in dextrose 5% 200 mL PREMIX      1,000 mg  200 mL/hr over 60 Minutes Intravenous EVERY 8 HOURS 17 1415      17 1401  ibuprofen (ADVIL/MOTRIN) tablet 600 mg      600 mg Oral EVERY 8 HOURS PRN 17 1402               Contrast Orders - past 72 hours     None          Interpretation of levels and current regimen:  Trough level is  Supratherapeutic    Has serum creatinine changed > 50% in last 72 hours: No    Urine output:  unable to determine    Renal Function: Stable    Plan:  1.  Decrease Dose to 1000mg IV q 8h  2.  Pharmacy will check trough levels as appropriate in 1-3 Days.    3. Serum creatinine levels will be ordered daily for the  first week of therapy and at least twice weekly for subsequent weeks.      Rosemary Harden        .

## 2017-03-27 NOTE — PLAN OF CARE
Problem: Patient Goal: Social Work Focus  Goal: 1. Patient Goal: Social Work Focus  Participated in Care Rounds and met with Sofy. Plan is possible discharge tomorrow if able to convert to oral antibiotics and remain fever free. No needs identified at this time. Will remain available for discharge planning.

## 2017-03-27 NOTE — PLAN OF CARE
Face to face report given with opportunity to observe patient.    Report given to CHERYL Tubbs   3/27/2017  3:28 PM

## 2017-03-27 NOTE — PLAN OF CARE
"Problem: Pneumonia (Adult)  Goal: Signs and Symptoms of Listed Potential Problems Will be Absent or Manageable (Pneumonia)  Signs and symptoms of listed potential problems will be absent or manageable by discharge/transition of care (reference Pneumonia (Adult) CPG).   Outcome: Improving  ALert and oriented throughout the night.  Did receive 1 dose of ibuprofen for headache during the night, fell asleep afterward.  Persistant cough remains.  Non productive.  Temps through the night 99.7,100.7 and 98.5.  Heartrate regular 87 and 88. BP's as charted.  Lungs with some coarseness on the left noted.  Has been up and ambulating to the bathroom and then out for a walk independently.  IV's infusing without incident.  Potassium up to 4.0 after replacement.  \"Feeling stronger\".       "

## 2017-03-27 NOTE — PLAN OF CARE
Face to face report given with opportunity to observe patient.  Report given to Jessy GOOD RN.    Renetta Hernandez  3/26/2017, 7:40 PM    .

## 2017-03-27 NOTE — PROGRESS NOTES
[facility]  Family Practice Progress Note               Interval History:   Pt is feeling better and wants to go home              Review of Systems:   The 5 point Review of Systems is negative other than noted in the HPI             Medications:   I have reviewed this patient's current medications               Physical Exam:   Vitals were reviewed  Constitutional:   Still running low grade fever     Lungs:   No increased work of breathing, good air exchange, clear to auscultation bilaterally, no crackles or wheezing     Cardiovascular:   Normal apical impulse, regular rate and rhythm, normal S1 and S2, no S3 or S4, and no murmur noted         Peripheral IV 03/24/17 Left (Active)   Site Assessment WDL 3/27/2017  9:00 AM   Line Status Saline locked 3/27/2017  9:00 AM   Phlebitis Scale 0-->no symptoms 3/27/2017  9:00 AM   Infiltration Scale 0 3/27/2017  9:00 AM   Extravasation? No 3/25/2017  9:00 AM   Number of days:3       Peripheral IV 03/24/17 Right Hand (Active)   Site Assessment [facility] 3/27/2017  9:00 AM   Line Status Infusing 3/27/2017  9:00 AM   Phlebitis Scale 0-->no symptoms 3/27/2017  9:00 AM   Infiltration Scale 0 3/27/2017  9:00 AM   Extravasation? No 3/25/2017  9:00 AM   Number of days:3     Line/device assessment(s) completed for medical necessity         Data:   All laboratory data reviewed         Assessment and Plan:   Principal Problem:    Pneumonia of left lower lobe due to infectious organism    Assessment: improving will follow    Plan: will get cxr today and cont current antibiotics and change to PO when afebrile  Active Problems:    Sepsis due to other etiology (H)    Assessment: resolved    Plan: will follow with influenza and pneumonia    Hypoxia    Assessment: resolved    Plan: of oxygen will follow

## 2017-03-27 NOTE — PLAN OF CARE
"Problem: Goal Outcome Summary  Goal: Goal Outcome Summary  Outcome: Improving  /79 (BP Location: Right arm)  Pulse 73  Temp 100.4  F (38  C) (Tympanic)  Resp 18  Ht 1.702 m (5' 7\")  Wt 62.7 kg (138 lb 3.7 oz)  SpO2 (!) 90%  BMI 21.65 kg/m2     Pt alert and oriented upon initial assessment. C/O headache pain 6/10. Tramadol given, pt finds relief. Lungs are dim/clear in BLL. Temp up to 100.9 this shift, Ibprofen given per pt request. Walking in halls frequently. Fair appetite, became nauseous after Tramadol administration, Zofran given.  LR at 50/hr. Vanco,Levaquin, and Elizabet infused this shift. CXR this afternoon.  at bedside all of shift. IND in room. No falls or injuries this shift. Will continue to monitor.         Problem: Pneumonia (Adult)  Goal: Signs and Symptoms of Listed Potential Problems Will be Absent or Manageable (Pneumonia)  Signs and symptoms of listed potential problems will be absent or manageable by discharge/transition of care (reference Pneumonia (Adult) CPG).   Outcome: Improving    03/27/17 1408   Pneumonia   Problems Assessed (Pneumonia) all   Problems Present (Pneumonia) none           "

## 2017-03-28 VITALS
OXYGEN SATURATION: 94 % | WEIGHT: 156.53 LBS | SYSTOLIC BLOOD PRESSURE: 131 MMHG | RESPIRATION RATE: 20 BRPM | HEIGHT: 67 IN | DIASTOLIC BLOOD PRESSURE: 85 MMHG | TEMPERATURE: 100.3 F | HEART RATE: 83 BPM | BODY MASS INDEX: 24.57 KG/M2

## 2017-03-28 PROCEDURE — 25000125 ZZHC RX 250: Performed by: FAMILY MEDICINE

## 2017-03-28 PROCEDURE — S0077 INJECTION, CLINDAMYCIN PHOSP: HCPCS | Performed by: FAMILY MEDICINE

## 2017-03-28 RX ORDER — LEVOFLOXACIN 500 MG/1
500 TABLET, FILM COATED ORAL DAILY
Qty: 5 TABLET | Refills: 0 | Status: SHIPPED | OUTPATIENT
Start: 2017-03-28 | End: 2017-04-02

## 2017-03-28 RX ADMIN — CLINDAMYCIN IN 5 PERCENT DEXTROSE 900 MG: 18 INJECTION, SOLUTION INTRAVENOUS at 05:20

## 2017-03-28 RX ADMIN — VANCOMYCIN HYDROCHLORIDE 1000 MG: 1 INJECTION, SOLUTION INTRAVENOUS at 07:00

## 2017-03-28 NOTE — DISCHARGE SUMMARY
[facility]    Discharge Summary  Hospitalist    Date of Admission:  3/24/2017  Date of Discharge:  3/28/2017  Discharging Provider: Kip Sutherland  Date of Service (when I saw the patient): 03/28/17      Hospital Course   Sofy Grossman was admitted on 3/24/2017.  The following problems were addressed during her hospitalization:    Principal Problem:    Pneumonia of left lower lobe due to infectious organism    Assessment: most likely strep pneumonia but resolving    Plan: dc home with oral antibiotics for 5 more days  Active Problems:    Sepsis due to other etiology (H)    Assessment: resolved Bp normal    Plan: dc home     Hypoxia    Assessment: resolved     Plan: will get smoking cessation and pneumovax as outpt      Kip Sutherland    Significant Results and Procedures       Pending Results   These results will be followed up by ronna  Unresulted Labs Ordered in the Past 30 Days of this Admission     Date and Time Order Name Status Description    3/24/2017 1159 Blood culture Preliminary     3/24/2017 1159 Blood culture Preliminary           Code Status   Full Code       Primary Care Physician   Kip Sutherland    Physical Exam   Temp: 98.9  F (37.2  C) Temp src: Tympanic BP: 129/83 Pulse: 83 Heart Rate: 89 Resp: 20 SpO2: 92 % O2 Device: None (Room air)    Vitals:    03/24/17 1220 03/28/17 0632   Weight: 62.7 kg (138 lb 3.7 oz) 71 kg (156 lb 8.4 oz)     Vital Signs with Ranges  Temp:  [98.8  F (37.1  C)-100.9  F (38.3  C)] 98.9  F (37.2  C)  Pulse:  [83] 83  Heart Rate:  [83-89] 89  Resp:  [18-20] 20  BP: (103-129)/(73-83) 129/83  SpO2:  [90 %-94 %] 92 %  I/O last 3 completed shifts:  In: 995 [I.V.:995]  Out: -     Respiratory: No increased work of breathing, good air exchange, clear to auscultation bilaterally, no crackles or wheezing  Cardiovascular: Normal apical impulse, regular rate and rhythm, normal S1 and S2, no S3 or S4, and no murmur noted  GI: No scars, normal bowel  "sounds, soft, non-distended, non-tender, no masses palpated, no hepatosplenomegally    Discharge Disposition   Discharged to home  Condition at discharge: Stable    Consultations This Hospital Stay   SPIRITUAL HEALTH SERVICES IP CONSULT  PHARMACY TO DOSE SAYDA    Time Spent on this Encounter   IKip, personally saw the patient today and spent greater than 30 minutes discharging this patient.    Discharge Orders     Follow-up and recommended labs and tests    Follow up with primary care provider, Kip Sutherland, within 7 days for hospital follow- up.  No follow up labs or test are needed.     Activity   Your activity upon discharge: activity as tolerated     Full Code     Diet   Follow this diet upon discharge: Regular       Discharge Medications   Current Discharge Medication List      START taking these medications    Details   levofloxacin (LEVAQUIN) 500 MG tablet Take 1 tablet (500 mg) by mouth daily for 5 days  Qty: 5 tablet, Refills: 0    Associated Diagnoses: Pneumonia of left lower lobe due to infectious organism         CONTINUE these medications which have NOT CHANGED    Details   ACETAMINOPHEN PO Take 500 mg by mouth every 4 hours as needed for pain      IBUPROFEN PO Take 600 mg by mouth every 8 hours as needed for moderate pain      NONFORMULARY as needed \"Eladil cream\" for exema         STOP taking these medications       Oseltamivir Phosphate (TAMIFLU PO) Comments:   Reason for Stopping:             Allergies   Allergies   Allergen Reactions     Doxycycline Nausea and Vomiting     Penicillin G      Penicillins      Data   Most Recent 3 CBC's:  Recent Labs   Lab Test  03/27/17   0507  03/26/17   0505  03/25/17   0455   WBC  8.7  6.5  3.4*   HGB  10.3*  9.3*  11.5*   MCV  89  90  89   PLT  107*  77*  76*      Most Recent 3 BMP's:  Recent Labs   Lab Test  03/27/17   0507  03/27/17   0222  03/26/17   1750  03/26/17   0505   03/25/17   0455   NA  141   --    --   141   --   141 "   POTASSIUM  3.9  4.0  3.3*  3.3*   < >  3.1*   CHLORIDE  111*   --    --   108   --   109   CO2  22   --    --   24   --   24   BUN  7   --    --   4*   --   8   CR  0.74   --    --   0.56   --   0.64   ANIONGAP  8   --    --   9   --   8   KOAT  7.5*   --    --   7.5*   --   7.2*   GLC  91   --    --   69*   --   86    < > = values in this interval not displayed.     Most Recent 2 LFT's:  Recent Labs   Lab Test  03/25/17   0455  03/24/17   2353   AST  19  19   ALT  16  15   ALKPHOS  22*  21*   BILITOTAL  0.5  0.6     Most Recent INR's and Anticoagulation Dosing History:  Anticoagulation Dose History     There is no flowsheet data to display.        Most Recent 3 Troponin's:  Recent Labs   Lab Test  05/14/16   0840   TROPI  <0.015  The 99th percentile for upper reference range is 0.045 ug/L.  Troponin values in   the range of 0.045 - 0.120 ug/L may be associated with risks of adverse   clinical events.       Most Recent Cholesterol Panel:No lab results found.  Most Recent 6 Bacteria Isolates From Any Culture (See EPIC Reports for Culture Details):  Recent Labs   Lab Test  03/24/17   1212  03/24/17   1204   CULT  No growth after 4 days  No growth after 4 days     Most Recent TSH, T4 and A1c Labs:No lab results found.

## 2017-03-28 NOTE — PLAN OF CARE
Problem: Goal Outcome Summary  Goal: Goal Outcome Summary  Outcome: Improving  Pt alert and orientated.  Ate fair for supper.  Up ad mayda.  sats 93% ra.  IV sites good.   Void ok.  Uses call light appropriately.    Problem: Pneumonia (Adult)  Goal: Signs and Symptoms of Listed Potential Problems Will be Absent or Manageable (Pneumonia)  Signs and symptoms of listed potential problems will be absent or manageable by discharge/transition of care (reference Pneumonia (Adult) CPG).   Outcome: Declining  Has frequent loose cough. Refused cough med.  Refused tamaflu.  Temp- 98.8

## 2017-03-28 NOTE — PLAN OF CARE
Face to face report given with opportunity to observe patient.    Report given to Hayele yuan, Audrey Garay   3/27/2017  11:19 PM

## 2017-03-28 NOTE — PROGRESS NOTES
Name: Sofy Grossman    MRN#: [id number]    Reason for Hospitalization: Left Lower Lobe Pneumonia   Hypoxia  Sepsis (H)    Discharge Date: 3/28/2017    Patient / Family response to discharge plan: in agreement    Follow-Up Appt: No future appointments.    Other Providers (Care Coordinator, County Services, PCA services etc): No    Discharge Disposition: home, no services.    Sandrita Serna

## 2017-03-28 NOTE — PLAN OF CARE
Face to face report given with opportunity to observe patient.    Report given to CHERYL Young   3/28/2017  7:17 AM

## 2017-03-28 NOTE — PLAN OF CARE
Patient discharged at 9:02 AM via wheel chair accompanied by spouse and staff. Prescriptions sent to patients preferred pharmacy. All belongings sent with patient.     Discharge instructions reviewed with patient and spouse. . Listed belongings gathered and returned to patient.     Patient discharged to home .       Core Measures and Vaccines  Core Measures applicable during stay: Yes. If yes, state diagnosis: pneumonia   Pneumonia and Influenza given prior to discharge, if indicated: No Patient will take at followup appointment     Surgical Patient   Surgical Procedures during stay: NA   Did patient receive discharge instruction on wound care and recognition of infection symptoms? N/A    MISC  Follow up appointment made:  No, patient, will make own appointment Home and hospital aquired medications returned to patient: N/A  Patient reports pain was well managed at discharge: Yes

## 2017-03-28 NOTE — PLAN OF CARE
Problem: Goal Outcome Summary  Goal: Goal Outcome Summary  Outcome: Improving  Tylenol given for temperature 100.1f. Vital signs otherwise stable. Alert and oriented.  Uses call light appropriately.  Lung sounds clear and diminished.

## 2017-03-29 ENCOUNTER — TELEPHONE (OUTPATIENT)
Dept: CASE MANAGEMENT | Facility: HOSPITAL | Age: 42
End: 2017-03-29

## 2017-03-30 ENCOUNTER — TELEPHONE (OUTPATIENT)
Dept: CASE MANAGEMENT | Facility: HOSPITAL | Age: 42
End: 2017-03-30

## 2017-03-30 LAB
BACTERIA SPEC CULT: NORMAL
BACTERIA SPEC CULT: NORMAL
MICRO REPORT STATUS: NORMAL
MICRO REPORT STATUS: NORMAL
SPECIMEN SOURCE: NORMAL
SPECIMEN SOURCE: NORMAL

## 2017-03-30 NOTE — TELEPHONE ENCOUNTER
Sofy Grossman, was discharged to home on 3/28/17  from [facility]. I spoke today with her regarding her discharge.   Sheindicates she receive(d) sufficient information upon discharge. Medications were reviewed in full on discharge, including: Medications to be started; medications to be stopped; medications to be continued from preadmission and any side effects. Prescriptions were sent to patients preferred pharamacy  to be filled. Medications are being taken as prescribed.   She indicates they have an appointment scheduled for today Mar 30 and are actually at the clinic now to see Dr. Sutherland.   She did not have any questions regarding discharge instructions or condition.  Per their request, the following employee (s) can be recognized for their outstanding services delivered:  Nurses Emilie Jackson Kelly. Aids Carloz and Eran.  She was not pleased with the care given by nurse Colmenares. She did not give her a med that she knew she could have been given and when she took her BP she told her the cuff was too tight and to loosen it a bit but she did not do it.  Suggestions to improve service: nothing indicated  She was informed they may receive a survey in the mail from the hospital. Asked if they would kindly complete the survey in order for [facility] to know if services fully met patient needs.

## 2017-11-28 DIAGNOSIS — R00.2 PALPITATIONS: Primary | ICD-10-CM

## 2023-10-13 ENCOUNTER — IMMUNIZATION (OUTPATIENT)
Dept: FAMILY MEDICINE | Facility: OTHER | Age: 48
End: 2023-10-13
Attending: FAMILY MEDICINE
Payer: COMMERCIAL

## 2023-10-13 DIAGNOSIS — Z23 HIGH PRIORITY FOR 2019-NCOV VACCINE: Primary | ICD-10-CM

## 2023-10-13 PROCEDURE — 91320 SARSCV2 VAC 30MCG TRS-SUC IM: CPT

## 2023-10-13 PROCEDURE — 90480 ADMN SARSCOV2 VAC 1/ONLY CMP: CPT
